# Patient Record
Sex: FEMALE | Race: WHITE | NOT HISPANIC OR LATINO | Employment: PART TIME | URBAN - METROPOLITAN AREA
[De-identification: names, ages, dates, MRNs, and addresses within clinical notes are randomized per-mention and may not be internally consistent; named-entity substitution may affect disease eponyms.]

---

## 2017-04-22 ENCOUNTER — APPOINTMENT (EMERGENCY)
Dept: RADIOLOGY | Facility: HOSPITAL | Age: 62
End: 2017-04-22
Payer: COMMERCIAL

## 2017-04-22 ENCOUNTER — HOSPITAL ENCOUNTER (EMERGENCY)
Facility: HOSPITAL | Age: 62
Discharge: HOME | End: 2017-04-22
Attending: EMERGENCY MEDICINE | Admitting: EMERGENCY MEDICINE
Payer: COMMERCIAL

## 2017-04-22 VITALS
WEIGHT: 130 LBS | DIASTOLIC BLOOD PRESSURE: 71 MMHG | OXYGEN SATURATION: 99 % | RESPIRATION RATE: 18 BRPM | SYSTOLIC BLOOD PRESSURE: 157 MMHG | BODY MASS INDEX: 22.31 KG/M2 | TEMPERATURE: 97.1 F | HEART RATE: 86 BPM

## 2017-04-22 DIAGNOSIS — M25.551 RIGHT HIP PAIN: Primary | ICD-10-CM

## 2017-04-22 PROCEDURE — 73552 X-RAY EXAM OF FEMUR 2/>: CPT

## 2017-04-22 PROCEDURE — 99283 EMERGENCY DEPT VISIT LOW MDM: CPT

## 2017-04-22 RX ORDER — OXYCODONE HYDROCHLORIDE 5 MG/1
5 TABLET ORAL ONCE
Status: COMPLETED | OUTPATIENT
Start: 2017-04-22 | End: 2017-04-22

## 2017-04-22 RX ORDER — DIAZEPAM 5 MG/1
5 TABLET ORAL ONCE
Status: COMPLETED | OUTPATIENT
Start: 2017-04-22 | End: 2017-04-22

## 2017-04-22 RX ADMIN — OXYCODONE HYDROCHLORIDE 5 MG: 5 TABLET ORAL at 11:11

## 2017-04-22 RX ADMIN — DIAZEPAM 5 MG: 5 TABLET ORAL at 11:11

## 2017-08-02 ENCOUNTER — HOSPITAL ENCOUNTER (EMERGENCY)
Facility: HOSPITAL | Age: 62
Discharge: HOME | End: 2017-08-02
Attending: EMERGENCY MEDICINE | Admitting: EMERGENCY MEDICINE

## 2017-08-02 VITALS
SYSTOLIC BLOOD PRESSURE: 217 MMHG | TEMPERATURE: 97.6 F | OXYGEN SATURATION: 96 % | RESPIRATION RATE: 18 BRPM | DIASTOLIC BLOOD PRESSURE: 108 MMHG | HEART RATE: 95 BPM

## 2017-08-02 DIAGNOSIS — L25.9 CONTACT DERMATITIS: Primary | ICD-10-CM

## 2017-08-02 PROCEDURE — 96372 THER/PROPH/DIAG INJ SC/IM: CPT

## 2017-08-02 PROCEDURE — 99282 EMERGENCY DEPT VISIT SF MDM: CPT

## 2017-08-02 RX ORDER — PREDNISONE 20 MG/1
60 TABLET ORAL DAILY
Qty: 26 TABLET | Refills: 0 | Status: SHIPPED | OUTPATIENT
Start: 2017-08-02 | End: 2017-08-06

## 2017-08-02 RX ORDER — METHYLPREDNISOLONE ACETATE 80 MG/ML
80 INJECTION, SUSPENSION INTRA-ARTICULAR; INTRALESIONAL; INTRAMUSCULAR; SOFT TISSUE ONCE
Status: COMPLETED | OUTPATIENT
Start: 2017-08-02 | End: 2017-08-02

## 2017-08-02 RX ORDER — HYDROXYZINE HYDROCHLORIDE 25 MG/1
25 TABLET, FILM COATED ORAL EVERY 6 HOURS PRN
Qty: 30 TABLET | Refills: 0 | Status: SHIPPED | OUTPATIENT
Start: 2017-08-02 | End: 2017-11-05

## 2017-08-02 RX ADMIN — METHYLPREDNISOLONE ACETATE 80 MG: 80 INJECTION, SUSPENSION INTRA-ARTICULAR; INTRALESIONAL; INTRAMUSCULAR; SOFT TISSUE at 19:22

## 2017-11-05 ENCOUNTER — APPOINTMENT (EMERGENCY)
Dept: ULTRASOUND IMAGING | Facility: HOSPITAL | Age: 62
End: 2017-11-05

## 2017-11-05 ENCOUNTER — APPOINTMENT (EMERGENCY)
Dept: RADIOLOGY | Facility: HOSPITAL | Age: 62
End: 2017-11-05

## 2017-11-05 ENCOUNTER — HOSPITAL ENCOUNTER (EMERGENCY)
Facility: HOSPITAL | Age: 62
Discharge: HOME | End: 2017-11-05
Attending: EMERGENCY MEDICINE | Admitting: EMERGENCY MEDICINE

## 2017-11-05 VITALS
DIASTOLIC BLOOD PRESSURE: 101 MMHG | SYSTOLIC BLOOD PRESSURE: 137 MMHG | OXYGEN SATURATION: 98 % | TEMPERATURE: 98.4 F | WEIGHT: 146.39 LBS | RESPIRATION RATE: 18 BRPM | HEART RATE: 99 BPM | BODY MASS INDEX: 25.13 KG/M2

## 2017-11-05 DIAGNOSIS — M79.606 LEG PAIN: Primary | ICD-10-CM

## 2017-11-05 LAB
ANION GAP SERPL CALCULATED.3IONS-SCNC: 9 MMOL/L (ref 4–13)
APTT PPP: 30 SECONDS (ref 23–35)
BASOPHILS # BLD AUTO: 0.07 THOUSANDS/ΜL (ref 0–0.1)
BASOPHILS NFR BLD AUTO: 1 % (ref 0–1)
BUN SERPL-MCNC: 13 MG/DL (ref 5–25)
CALCIUM SERPL-MCNC: 8.5 MG/DL (ref 8.3–10.1)
CHLORIDE SERPL-SCNC: 105 MMOL/L (ref 100–108)
CO2 SERPL-SCNC: 29 MMOL/L (ref 21–32)
CREAT SERPL-MCNC: 0.87 MG/DL (ref 0.6–1.3)
EOSINOPHIL # BLD AUTO: 0.2 THOUSAND/ΜL (ref 0–0.61)
EOSINOPHIL NFR BLD AUTO: 4 % (ref 0–6)
ERYTHROCYTE [DISTWIDTH] IN BLOOD BY AUTOMATED COUNT: 12.7 % (ref 11.6–15.1)
GFR SERPL CREATININE-BSD FRML MDRD: 73 ML/MIN/1.73SQ M
GLUCOSE SERPL-MCNC: 89 MG/DL (ref 65–140)
HCT VFR BLD AUTO: 39.7 % (ref 34.8–46.1)
HGB BLD-MCNC: 12.5 G/DL (ref 11.5–15.4)
INR PPP: 0.87 (ref 0.86–1.16)
LYMPHOCYTES # BLD AUTO: 2.24 THOUSANDS/ΜL (ref 0.6–4.47)
LYMPHOCYTES NFR BLD AUTO: 45 % (ref 14–44)
MCH RBC QN AUTO: 30.6 PG (ref 26.8–34.3)
MCHC RBC AUTO-ENTMCNC: 31.5 G/DL (ref 31.4–37.4)
MCV RBC AUTO: 97 FL (ref 82–98)
MONOCYTES # BLD AUTO: 0.35 THOUSAND/ΜL (ref 0.17–1.22)
MONOCYTES NFR BLD AUTO: 7 % (ref 4–12)
NEUTROPHILS # BLD AUTO: 2.16 THOUSANDS/ΜL (ref 1.85–7.62)
NEUTS SEG NFR BLD AUTO: 43 % (ref 43–75)
PLATELET # BLD AUTO: 240 THOUSANDS/UL (ref 149–390)
PMV BLD AUTO: 9.4 FL (ref 8.9–12.7)
POTASSIUM SERPL-SCNC: 3.8 MMOL/L (ref 3.5–5.3)
PROTHROMBIN TIME: 12.1 SECONDS (ref 12.1–14.4)
RBC # BLD AUTO: 4.09 MILLION/UL (ref 3.81–5.12)
SODIUM SERPL-SCNC: 143 MMOL/L (ref 136–145)
WBC # BLD AUTO: 5.02 THOUSAND/UL (ref 4.31–10.16)

## 2017-11-05 PROCEDURE — 73552 X-RAY EXAM OF FEMUR 2/>: CPT

## 2017-11-05 PROCEDURE — 96374 THER/PROPH/DIAG INJ IV PUSH: CPT

## 2017-11-05 PROCEDURE — 93970 EXTREMITY STUDY: CPT

## 2017-11-05 PROCEDURE — 80048 BASIC METABOLIC PNL TOTAL CA: CPT | Performed by: PHYSICIAN ASSISTANT

## 2017-11-05 PROCEDURE — 85730 THROMBOPLASTIN TIME PARTIAL: CPT | Performed by: PHYSICIAN ASSISTANT

## 2017-11-05 PROCEDURE — 73590 X-RAY EXAM OF LOWER LEG: CPT

## 2017-11-05 PROCEDURE — 36415 COLL VENOUS BLD VENIPUNCTURE: CPT | Performed by: PHYSICIAN ASSISTANT

## 2017-11-05 PROCEDURE — 99284 EMERGENCY DEPT VISIT MOD MDM: CPT

## 2017-11-05 PROCEDURE — 85610 PROTHROMBIN TIME: CPT | Performed by: PHYSICIAN ASSISTANT

## 2017-11-05 PROCEDURE — 85025 COMPLETE CBC W/AUTO DIFF WBC: CPT | Performed by: PHYSICIAN ASSISTANT

## 2017-11-05 RX ORDER — IBUPROFEN 600 MG/1
600 TABLET ORAL EVERY 6 HOURS PRN
Qty: 30 TABLET | Refills: 0 | Status: SHIPPED | OUTPATIENT
Start: 2017-11-05 | End: 2018-09-14 | Stop reason: HOSPADM

## 2017-11-05 RX ORDER — KETOROLAC TROMETHAMINE 30 MG/ML
15 INJECTION, SOLUTION INTRAMUSCULAR; INTRAVENOUS ONCE
Status: COMPLETED | OUTPATIENT
Start: 2017-11-05 | End: 2017-11-05

## 2017-11-05 RX ADMIN — KETOROLAC TROMETHAMINE 15 MG: 30 INJECTION, SOLUTION INTRAMUSCULAR at 13:22

## 2017-11-05 NOTE — ED PROVIDER NOTES
History  Chief Complaint   Patient presents with    Leg Pain     c/o bilateral leg pain x 29 days  Patient presents to emergency department bilateral leg pain been getting worse  Patient walks a lot at work he has been having persistent pain  Patient is particularly having pain at calf bilaterally  Special the right  No recent flights or travel or hormonal medication however will do venous about to make sure there is no DVT  Patient agrees  Patient have him hip fracture and has had surgery to the right several years ago  Patient taking over-the-counter cough medication without relief  None       Past Medical History:   Diagnosis Date    Arthritis     Hypertension     Osteoporosis        Past Surgical History:   Procedure Laterality Date    CARPAL TUNNEL RELEASE Right     FRACTURE SURGERY      Right hip ORIF    LA OPEN FIX INTER/SUBTROCH FX,IMPLNT Right 4/29/2016    Procedure: Removal of deep implant; IM inpending right femur fracture;  Surgeon: Rose Marie Johnston MD;  [facility]: [facility];  Service: Orthopedics    TUBAL LIGATION         Family History   Problem Relation Age of Onset    Hypertension Mother     Thyroid disease Mother     Osteoporosis Mother     Hypertension Father     Aneurysm Father      I have reviewed and agree with the history as documented  Social History   Substance Use Topics    Smoking status: Current Every Day Smoker     Types: Cigarettes    Smokeless tobacco: Not on file      Comment: 2 packs a week    Alcohol use No        Review of Systems   All other systems reviewed and are negative        Physical Exam  ED Triage Vitals [11/05/17 1133]   Temperature Pulse Respirations Blood Pressure SpO2   98 4 °F (36 9 °C) 99 18 (!) 137/101 98 %      Temp Source Heart Rate Source Patient Position - Orthostatic VS BP Location FiO2 (%)   Oral Monitor Sitting Right arm --      Pain Score       Worst Possible Pain           Orthostatic Vital Signs  Vitals: 11/05/17 1133   BP: (!) 137/101   Pulse: 99   Patient Position - Orthostatic VS: Sitting       Physical Exam   Constitutional: She is oriented to person, place, and time  She appears well-developed and well-nourished  HENT:   Head: Normocephalic  Mouth/Throat: Oropharynx is clear and moist    Eyes: Conjunctivae and EOM are normal    Neck: Neck supple  Cardiovascular: Normal rate, regular rhythm and normal heart sounds  Pulmonary/Chest: Effort normal and breath sounds normal    Abdominal: Soft  Bowel sounds are normal    Musculoskeletal:   Patient has diffuse pain to both of her legs especially to Kitty reproduce to palpation  There is no significant edema  Good pulses good range of motion patient has pain to her entire legs bilaterally  No contusions or abrasions  Neurological: She is alert and oriented to person, place, and time  Skin: Skin is warm  Psychiatric: She has a normal mood and affect  Her behavior is normal    Nursing note and vitals reviewed  ED Medications  Medications   ketorolac (TORADOL) 30 mg/mL injection 15 mg (15 mg Intravenous Given 11/5/17 1322)       Diagnostic Studies  Results Reviewed     Procedure Component Value Units Date/Time    Protime-INR [[id number]]  (Normal) Collected:  11/05/17 1246    Lab Status:  Final result Specimen:  Blood from Arm, Right Updated:  11/05/17 1301     Protime 12 1 seconds      INR 0 87    APTT [[id number]]  (Normal) Collected:  11/05/17 1246    Lab Status:  Final result Specimen:  Blood from Arm, Right Updated:  11/05/17 1301     PTT 30 seconds     Narrative:          Therapeutic Heparin Range = 60-90 seconds    Basic metabolic panel [[id number]] Collected:  11/05/17 1246    Lab Status:  Final result Specimen:  Blood from Arm, Right Updated:  11/05/17 1301     Sodium 143 mmol/L      Potassium 3 8 mmol/L      Chloride 105 mmol/L      CO2 29 mmol/L      Anion Gap 9 mmol/L      BUN 13 mg/dL      Creatinine 0 87 mg/dL      Glucose 89 mg/dL Calcium 8 5 mg/dL      eGFR 73 ml/min/1 73sq m     Narrative:         National Kidney Disease Education Program recommendations are as follows:  GFR calculation is accurate only with a steady state creatinine  Chronic Kidney disease less than 60 ml/min/1 73 sq  meters  Kidney failure less than 15 ml/min/1 73 sq  meters  CBC and differential [[id number]]  (Abnormal) Collected:  11/05/17 1246    Lab Status:  Final result Specimen:  Blood from Arm, Right Updated:  11/05/17 1253     WBC 5 02 Thousand/uL      RBC 4 09 Million/uL      Hemoglobin 12 5 g/dL      Hematocrit 39 7 %      MCV 97 fL      MCH 30 6 pg      MCHC 31 5 g/dL      RDW 12 7 %      MPV 9 4 fL      Platelets 962 Thousands/uL      Neutrophils Relative 43 %      Lymphocytes Relative 45 (H) %      Monocytes Relative 7 %      Eosinophils Relative 4 %      Basophils Relative 1 %      Neutrophils Absolute 2 16 Thousands/µL      Lymphocytes Absolute 2 24 Thousands/µL      Monocytes Absolute 0 35 Thousand/µL      Eosinophils Absolute 0 20 Thousand/µL      Basophils Absolute 0 07 Thousands/µL                  XR tibia fibula 2 views LEFT   Final Result by Lili Draper MD (11/05 1254)      Unremarkable examination         Workstation performed: [id number]         XR tibia fibula 2 views RIGHT   Final Result by Lili Draper MD (11/05 1254)      Unremarkable examination         Workstation performed: [id number]         XR femur 2 views LEFT   Final Result by Lili Draper MD (11/05 1253)      Unremarkable left femur exam         Workstation performed: [id number]         XR femur 2 views RIGHT   Final Result by Lili Draper MD (11/05 1252)      Stable appearance to right femur no acute osseous injury           Workstation performed: [id number]         VAS lower limb venous duplex study, complete bilateral    (Results Pending)              Procedures  Procedures       Phone Contacts  ED Phone Contact    ED Course  ED Course as of Nov 05 1349   Sun Nov 05, 2017   1328 Spoke with ultrasound negative for DVT  MDM  Number of Diagnoses or Management Options  Leg pain: new and requires workup     Amount and/or Complexity of Data Reviewed  Clinical lab tests: reviewed  Tests in the radiology section of CPT®: reviewed  Discuss the patient with other providers: yes  Independent visualization of images, tracings, or specimens: yes    Patient Progress  Patient progress: improved    CritCare Time    Disposition  Final diagnoses:   Leg pain     Time reflects when diagnosis was documented in both MDM as applicable and the Disposition within this note     Time User Action Codes Description Comment    11/5/2017  1:45 PM Summer Washington [M79 606] Leg pain       ED Disposition     ED Disposition Condition Comment    Discharge  Delta Waldemar discharge to home/self care  Condition at discharge: Good        Follow-up Information     Follow up With Specialties Details Why Contact Info Additional [address], DO Internal Medicine   [address]  [phone]       [address] [facility] [address]  [phone] [facility], [address], [place], [place], [address]        Patient's Medications   Discharge Prescriptions    IBUPROFEN (MOTRIN) 600 MG TABLET    Take 1 tablet by mouth every 6 (six) hours as needed for mild pain for up to 10 days       Start Date: 11/5/2017 End Date: 11/15/2017       Order Dose: 600 mg       Quantity: 30 tablet    Refills: 0     No discharge procedures on file      ED Provider  Electronically Signed by           Julia Thornton PA-C  11/05/17 9017

## 2017-11-05 NOTE — DISCHARGE INSTRUCTIONS
Leg Cramps   WHAT YOU NEED TO KNOW:   A leg cramp is a sudden, painful squeeze in your calf (lower leg) or thigh (upper leg) muscles  The muscle may twitch under the skin or feel hard  Your leg may feel sore long after the muscles relax  DISCHARGE INSTRUCTIONS:   To stretch your leg:  Warm up your muscles before you stretch  Take a short walk or run slowly in place  If you get leg cramps while you sleep, you may also want to stretch before bedtime  The following exercises stretch the calves and thighs to help stop or prevent leg cramps:  · To stretch your calf and heel:      ¨ Stand up and place the palms of your hands against a wall  ¨ Lean into the wall, with one leg behind the other  Bend the front leg and keep the back leg straight  ¨ Press the heel of your back leg into the floor  ¨ Hold for 15 to 30 seconds, then switch sides  · To stretch the back of your knee and thigh:      ¨ Sit on the floor with your legs straight in front of you  Do not point your toes  ¨ Place the palms of your hands at your sides on the floor  Slide your hands past your hips toward your ankles  ¨ Move toward your ankles until you feel a stretch in the back of your legs  Do not try to touch your head to your knees or round your back  ¨ Hold for 30 seconds  Drink plenty of liquids during exercise:  Water and other liquids help prevent cramps by replacing fluids lost in sweat  Drink more liquids when you are active in hot weather  To stop a leg cramp if it happens again:   · Stretch and massage your muscle to stop the cramp  · Apply heat or ice packs to the cramp  A heating pad or hot pack may help relieve tight muscles  An ice pack or crushed ice in a bag, wrapped in a towel can soothe tender or sore muscles  Take your medicine as directed:  Call your healthcare provider if you think your medicine is not helping or if you have side effects  Tell him if you are taking any vitamins, herbs, or other medicines  Keep a list of the medicines you take  Include the amounts, and when and why you take them  Bring the list or the pill bottles to follow-up visits  Follow up with your healthcare provider as directed:  Write down any questions you have so you remember to ask them in your follow-up visits  Contact your healthcare provider if:   · Your leg cramps get worse or happen more often  · Your leg feels numb  · Your leg cramps do not go away with stretching or massage  · You feel dizzy, lightheaded, or confused when you exercise in hot weather  You may have a headache or blurred vision  © 2017 [employer]  Information is for End User's use only and may not be sold, redistributed or otherwise used for commercial purposes  All illustrations and images included in CareNotes® are the copyrighted property of A D A M , Inc  or Thomas Gonzales  The above information is an  only  It is not intended as medical advice for individual conditions or treatments  Talk to your doctor, nurse or pharmacist before following any medical regimen to see if it is safe and effective for you  Leg Pain   WHAT YOU NEED TO KNOW:   Leg pain may be caused by a variety of health conditions  Your tests did not show any broken bones or blood clots  DISCHARGE INSTRUCTIONS:   Return to the emergency department if:   · You have a fever  · Your leg starts to swell  · Your leg pain gets worse  · You have numbness or tingling in your leg or toes  · You cannot put any weight on or move your leg  Contact your healthcare provider if:   · Your pain does not decrease, even after treatment  · You have questions or concerns about your condition or care  Medicines:   · NSAIDs , such as ibuprofen, help decrease swelling, pain, and fever  This medicine is available with or without a doctor's order  NSAIDs can cause stomach bleeding or kidney problems in certain people   If you take blood thinner medicine, always ask your healthcare provider if NSAIDs are safe for you  Always read the medicine label and follow directions  · Take your medicine as directed  Contact your healthcare provider if you think your medicine is not helping or if you have side effects  Tell him of her if you are allergic to any medicine  Keep a list of the medicines, vitamins, and herbs you take  Include the amounts, and when and why you take them  Bring the list or the pill bottles to follow-up visits  Carry your medicine list with you in case of an emergency  Follow up with your healthcare provider as directed: You may need more tests to find the cause of your leg pain  You may need to see an orthopedic specialist or a physical therapist  Write down your questions so you remember to ask them during your visits  Manage your leg pain:   · Rest  your injured leg so that it can heal  You may need an immobilizer, brace, or splint to limit the movement of your leg  You may need to avoid putting any weight on your leg for at least 48 hours  Return to normal activities as directed  · Ice  the injury for 20 minutes every 4 hours for up to 24 hours, or as directed  Use an ice pack, or put crushed ice in a plastic bag  Cover it with a towel to protect your skin  Ice helps prevent tissue damage and decreases swelling and pain  · Elevate  your injured leg above the level of your heart as often as you can  This will help decrease swelling and pain  If possible, prop your leg on pillows or blankets to keep the area elevated comfortably  · Use assistive devices as directed  You may need to use a cane or crutches  Assistive devices help decrease pain and pressure on your leg when you walk  Ask your healthcare provider for more information about assistive devices and how to use them correctly  · Maintain a healthy weight  Extra body weight can cause pressure and pain in your hip, knee, and ankle joints   Ask your healthcare provider how much you should weigh  Ask him to help you create a weight loss plan if you are overweight  © 2017 [employer] Information is for End User's use only and may not be sold, redistributed or otherwise used for commercial purposes  All illustrations and images included in CareNotes® are the copyrighted property of A D A M , Inc  or Thomas Gonzales  The above information is an  only  It is not intended as medical advice for individual conditions or treatments  Talk to your doctor, nurse or pharmacist before following any medical regimen to see if it is safe and effective for you

## 2018-01-11 NOTE — MISCELLANEOUS
Provider Comments  Provider Comments:   pt did not show  will need to reschedule        Signatures   Electronically signed by : Brittney Forrest DO; May 23 2016 11:28AM EST                       (Author)    Electronically signed by : Jeremie Garza DO; May 23 2016 11:55AM EST                       (Review)

## 2018-01-12 NOTE — PROGRESS NOTES
Preliminary Nursing Report                Patient Information    Initial Encounter Entry Date:   3/25/2016 12:53 PM EST (Automated Transmission Automated Transmission)       Last Modified:   {Adia Orozco}              Legal Name: Clarisa Martinez        Social Security Number:        YOB: 1955        Age (years): 64        Gender: F        Body Mass Index (BMI): 22 kg/m2        Height: 63 in  Weight: 123 lbs (56 kgs)           Address:   [address] [place] [address]              Phone: [phone]   (consent to leave messages)        Email:        Ethnicity: Decline to State        Christianity:        Marital Status:        Preferred Language: English        Race: Other Race                    Patient Insurance Information        Primary Insurance Information Carrier Name: {Primary  CarrierName}           Carrier Address:   {Primary  CarrierAddress}              Carrier Phone: {Primary  CarrierPhone}          Group Number: {Primary  GroupNumber}          Policy Number: {Primary  PolicyNumber}          Insured Name: {Primary  InsuredName}          Insured : {Primary  InsuredDOB}          Relationship to Insured: {Primary  RelationshiptoInsured}           Secondary Insurance Information Carrier Name: {Secondary  CarrierName}           Carrier Address:   {Secondary  CarrierAddress}              Carrier Phone: {Secondary  CarrierPhone}          Group Number: {Secondary  GroupNumber}          Policy Number: {Secondary  PolicyNumber}          Insured Name: {Secondary  InsuredName}          Insured : {Secondary  InsuredDOB}          Relationship to Insured: {Secondary  RelationshiptoInsured}                       Health Profile   Booking #:   Nuria Evans #: [id number]               DOS: 2016    Surgery : REPAIR OF THIGH FRACTURE        Add'l Procedures/Notes:     Surgery Risk: Intermediate          Precautions          Allergies    Naproxen TABS       Clinical Comments: Reaction Type: , Reaction: , Severity:              Medications    Acetaminophen-Codeine #3 300-30 MG Oral Tablet       Alendronate Sodium 70 MG Oral Tablet       AmLODIPine Besylate 5 MG Oral Tablet       Calcium 600+D High Potency 600-400 MG-UNIT Oral Tablet       TraMADol HCl - 50 MG Oral Tablet               Conditions    Benign hypertension       Breast cancer screening       Complicated open wound of thigh, right, sequela       Contusion of anterior thigh       Contusion Of The Hip With Intact Skin Surface       Current tobacco use       Encounter for smoking cessation counseling       Hearing Loss       Hip pain, right       Hip pain, unspecified laterality       Limb pain       Need for immunization against influenza       Need for influenza vaccination       Osteoporosis       Other complications due to other internal orthopedic device, implant, and graft       Pain in wrist, unspecified laterality       Pre-op evaluation       Refused influenza vaccine       Right knee pain       Screen for colon cancer       Tobacco abuse counseling               Family History    None             Surgical History    None             Social History    Being A Social Drinker       Current Diet High In Sugar Includes High Sugar Beverages       Current Every Day Smoker       Daily Coffee Consumption (1 Cups/Day)       Denies Alcohol Use (History)       Denies Drug Use                               Patient Instructions       ? NPO Instructions   The day before surgery it is recommended to have a light dinner at your usual time and you are allowed a light snack early in the evening  Do not eat anything heavy or eat a big meal after 7pm  Do not eat or drink anything after midnight prior to your surgery  If you are supposed to take any of your medications, do so with a sip of water  Failure to follow these instructions can lead to an increased risk of lung complications and may result in a delay or cancellation of your procedure   If you have any questions, contact your institution for further instructions  No candy, no gum, no mints, no chewing tobacco   Triggered by: Medical Procedure Risk         ? Calcium Blocker (Blood Pressure Medication) 3, 4, 6, 5, 2  Please continue to take this medication on your normal schedule  If this is an oral medication and you take in the morning, you may do so with a sip of water  Triggered by: AmLODIPine Besylate 5 MG Oral Tablet         ? Opioids (Pain Medication) 62  Please continue the following medications, if needed, up to and including the day of surgery (with a sip of water)  Triggered by: TraMADol HCl - 50 MG Oral Tablet         ? Smoking Cessation   Smoking before and after surgery can lead to complications  Patients who quit smoking at least eight weeks before surgery have complication rates almost as low as non-smokers  Smokers who can stop smoking 24 or 48 hours before surgery may also benefit from decreased amounts of nicotine and carbon monoxide in the body  For help quitting smoking, speak with your physician or contact the [address] [place] [address] or American [employer]  Please visit the following web address for assistance with quitting  [facility] be  [email or web address]  aspx  Triggered by: Current tobacco use               Testing Considerations       ? Basic Metabolic Panel (BMP) t  If test was completed and normal within last six months, repeat test is not necessary  Triggered by: Benign hypertension         ? Complete Blood Count (CBC) t, client, client  If test was completed and normal within last six months, repeat test is not necessary  Triggered by: Age or Facility Rec         ? Electrocardiogram (ECG) t  Patient does not need new test if normal ECG is present within the last six months and no change in clinical condition  Triggered by: Benign hypertension         ? Type and Screen client  Type and Screen - Blood:  If there is anticipated or possible large blood loss with this procedure, then a Type and Screen for Blood should be ordered  Triggered by: Age or Facility Rec               Consultations       ? Pain Management Notes client  The patient has listed conditions or takes medication that may affect their pain management  Triggered by: TraMADol HCl - 50 MG Oral Tablet, Age or Facility Rec               Miscellaneous Questions         Question: Are you able to walk up a flight of stairs, walk up a hill or do heavy housework WITHOUT having chest pain or shortness of breath? Answer: YES                   Allergies/Conditions/Medications Not Found        The following were not recognized by our system when generating the recommendations  Please consider if this would impact any preoperative protocols  ? Being A Social Drinker       ? Contusion Of The Hip With Intact Skin Surface       ? Current Diet High In Sugar Includes High Sugar Beverages       ? Daily Coffee Consumption (1 Cups/Day)       ? Denies Alcohol Use (History)       ? Denies Drug Use       ? Hearing Loss       ? Hip pain, unspecified laterality       ? Acetaminophen-Codeine #3 300-30 MG Oral Tablet                  Appointment Information         Date:    04/29/2016        Location:    [place]        Address:           Directions:                      Footnotes revision 14      ?? Denotes a free-text entry  Legal Disclaimer: Any and all recommendations and services provided herein are designed to assist in the preoperative care of the patient  Nothing contained herein is designed to replace, eliminate or alleviate the responsibility of the attending physician to supervise and determine the patient?s preoperative care and course of treatment  Failure to provide complete, accurate information may negatively impact the system?s ability to recommend the proper preoperative protocol       THE ATTENDING PHYSICIAN IS RESPONSIBLE TO REVIEW THE SUGGESTED PREOPERATIVE PROTOCOLS/COURSE OF TREATMENT AND PRESCRIBE THE FINAL COURSE OF PREOPERATIVE TREATMENT IN CONSULTATION WITH THE PATIENT  THE ePREOP SYSTEM AND ITS MATERIALS ARE PROVIDED ? AS IS? WITHOUT WARRANTY OF ANY KIND, EXPRESS OR IMPLIED, INCLUDING, BUT NOT LIMITED TO, WARRANTIES OF PERFORMANCE OR MERCHANTABILITY OR FITNESS FOR A PARTICULAR PURPOSE  PATIENT AND PHYSICIANS HEREBY AGREE THAT THEIR USE OF THE MATERIALS AND RESOURCES ACT AS A CONSENT TO RELEASE AND WAIVE ePREOP FROM ANY AND ALL CLAIMS OF WARRANTY, TORT OR CONTRACT LAW OF ANY KIND  Electronically signed by:Louisa SAMSON    Mar 29 2016  4:24PM EST

## 2018-09-04 ENCOUNTER — ANESTHESIA EVENT (OUTPATIENT)
Dept: PERIOP | Facility: HOSPITAL | Age: 63
End: 2018-09-04
Payer: COMMERCIAL

## 2018-09-04 RX ORDER — SODIUM CHLORIDE 9 MG/ML
125 INJECTION, SOLUTION INTRAVENOUS CONTINUOUS
Status: CANCELLED | OUTPATIENT
Start: 2018-09-14

## 2018-09-05 ENCOUNTER — APPOINTMENT (OUTPATIENT)
Dept: LAB | Facility: HOSPITAL | Age: 63
End: 2018-09-05
Attending: PODIATRIST
Payer: COMMERCIAL

## 2018-09-05 ENCOUNTER — HOSPITAL ENCOUNTER (OUTPATIENT)
Dept: NON INVASIVE DIAGNOSTICS | Facility: HOSPITAL | Age: 63
Discharge: HOME | End: 2018-09-05
Attending: PODIATRIST
Payer: COMMERCIAL

## 2018-09-05 ENCOUNTER — HOSPITAL ENCOUNTER (OUTPATIENT)
Dept: RADIOLOGY | Facility: HOSPITAL | Age: 63
Discharge: HOME | End: 2018-09-05
Attending: PODIATRIST
Payer: COMMERCIAL

## 2018-09-05 ENCOUNTER — TRANSCRIBE ORDERS (OUTPATIENT)
Dept: ADMINISTRATIVE | Facility: HOSPITAL | Age: 63
End: 2018-09-05

## 2018-09-05 ENCOUNTER — APPOINTMENT (OUTPATIENT)
Dept: PREADMISSION TESTING | Facility: HOSPITAL | Age: 63
End: 2018-09-05
Payer: COMMERCIAL

## 2018-09-05 DIAGNOSIS — Z01.818 PREOP EXAMINATION: ICD-10-CM

## 2018-09-05 DIAGNOSIS — M20.22 HALLUX RIGIDUS OF LEFT FOOT: ICD-10-CM

## 2018-09-05 DIAGNOSIS — Z01.818 PREOP EXAMINATION: Primary | ICD-10-CM

## 2018-09-05 LAB
ANION GAP SERPL CALCULATED.3IONS-SCNC: 7 MMOL/L (ref 4–13)
BASOPHILS # BLD AUTO: 0.08 THOUSANDS/ΜL (ref 0–0.1)
BASOPHILS NFR BLD AUTO: 1 % (ref 0–1)
BUN SERPL-MCNC: 18 MG/DL (ref 5–25)
CALCIUM SERPL-MCNC: 9.3 MG/DL (ref 8.3–10.1)
CHLORIDE SERPL-SCNC: 108 MMOL/L (ref 100–108)
CO2 SERPL-SCNC: 27 MMOL/L (ref 21–32)
CREAT SERPL-MCNC: 0.84 MG/DL (ref 0.6–1.3)
EOSINOPHIL # BLD AUTO: 0.1 THOUSAND/ΜL (ref 0–0.61)
EOSINOPHIL NFR BLD AUTO: 2 % (ref 0–6)
ERYTHROCYTE [DISTWIDTH] IN BLOOD BY AUTOMATED COUNT: 13.2 % (ref 11.6–15.1)
GFR SERPL CREATININE-BSD FRML MDRD: 75 ML/MIN/1.73SQ M
GLUCOSE SERPL-MCNC: 83 MG/DL (ref 65–140)
HCT VFR BLD AUTO: 39.2 % (ref 34.8–46.1)
HGB BLD-MCNC: 12.4 G/DL (ref 11.5–15.4)
IMM GRANULOCYTES # BLD AUTO: 0.01 THOUSAND/UL (ref 0–0.2)
IMM GRANULOCYTES NFR BLD AUTO: 0 % (ref 0–2)
LYMPHOCYTES # BLD AUTO: 2.44 THOUSANDS/ΜL (ref 0.6–4.47)
LYMPHOCYTES NFR BLD AUTO: 42 % (ref 14–44)
MCH RBC QN AUTO: 30.2 PG (ref 26.8–34.3)
MCHC RBC AUTO-ENTMCNC: 31.6 G/DL (ref 31.4–37.4)
MCV RBC AUTO: 95 FL (ref 82–98)
MONOCYTES # BLD AUTO: 0.28 THOUSAND/ΜL (ref 0.17–1.22)
MONOCYTES NFR BLD AUTO: 5 % (ref 4–12)
NEUTROPHILS # BLD AUTO: 2.93 THOUSANDS/ΜL (ref 1.85–7.62)
NEUTS SEG NFR BLD AUTO: 50 % (ref 43–75)
NRBC BLD AUTO-RTO: 0 /100 WBCS
PLATELET # BLD AUTO: 307 THOUSANDS/UL (ref 149–390)
PMV BLD AUTO: 9.7 FL (ref 8.9–12.7)
POTASSIUM SERPL-SCNC: 3.8 MMOL/L (ref 3.5–5.3)
RBC # BLD AUTO: 4.11 MILLION/UL (ref 3.81–5.12)
SODIUM SERPL-SCNC: 142 MMOL/L (ref 136–145)
WBC # BLD AUTO: 5.84 THOUSAND/UL (ref 4.31–10.16)

## 2018-09-05 PROCEDURE — 80048 BASIC METABOLIC PNL TOTAL CA: CPT

## 2018-09-05 PROCEDURE — 93005 ELECTROCARDIOGRAM TRACING: CPT

## 2018-09-05 PROCEDURE — 36415 COLL VENOUS BLD VENIPUNCTURE: CPT

## 2018-09-05 PROCEDURE — 85025 COMPLETE CBC W/AUTO DIFF WBC: CPT

## 2018-09-05 NOTE — ANESTHESIA PREPROCEDURE EVALUATION
Review of Systems/Medical History  Patient summary reviewed  Chart reviewed  No history of anesthetic complications     Cardiovascular  Hypertension (fluctuates) controlled,    Pulmonary  Smoker cigarette smoker  ,        GI/Hepatic  Negative GI/hepatic ROS          Negative  ROS        Endo/Other  Negative endo/other ROS      GYN  Negative gynecology ROS          Hematology  Anemia ,     Musculoskeletal    Arthritis     Neurology  Negative neurology ROS      Psychology   Anxiety,              Physical Exam    Airway    Mallampati score: II  TM Distance: >3 FB  Neck ROM: full     Dental   upper dentures and lower dentures,     Cardiovascular  Rhythm: regular, Rate: normal,     Pulmonary  Breath sounds clear to auscultation,     Other Findings        Anesthesia Plan  ASA Score- 3     Anesthesia Type- IV sedation with anesthesia with ASA Monitors  Additional Monitors:   Airway Plan: LMA  Plan Factors- Patient instructed to abstain from smoking on day of procedure       Induction- intravenous  Postoperative Plan- Plan for postoperative opioid use  Informed Consent- Anesthetic plan and risks discussed with patient

## 2018-09-05 NOTE — PRE-PROCEDURE INSTRUCTIONS
Pre-Surgery Instructions:   Medication Instructions    ibuprofen (MOTRIN) 600 mg tablet Patient was instructed by Physician and understands  Seen by Dr Nathan Kenyon- no medications required before surgery    Instructed re: Chlorhexidine showers per hospital policy

## 2018-09-06 LAB
ATRIAL RATE: 100 BPM
P AXIS: 54 DEGREES
PR INTERVAL: 170 MS
QRS AXIS: -14 DEGREES
QRSD INTERVAL: 110 MS
QT INTERVAL: 392 MS
QTC INTERVAL: 505 MS
T WAVE AXIS: 131 DEGREES
VENTRICULAR RATE: 100 BPM

## 2018-09-06 PROCEDURE — 93010 ELECTROCARDIOGRAM REPORT: CPT | Performed by: INTERNAL MEDICINE

## 2018-09-12 ENCOUNTER — TELEPHONE (OUTPATIENT)
Dept: INTERNAL MEDICINE CLINIC | Facility: CLINIC | Age: 63
End: 2018-09-12

## 2018-09-12 ENCOUNTER — HOSPITAL ENCOUNTER (OUTPATIENT)
Dept: RADIOLOGY | Facility: HOSPITAL | Age: 63
Discharge: HOME | End: 2018-09-12
Attending: PODIATRIST
Payer: COMMERCIAL

## 2018-09-12 ENCOUNTER — OFFICE VISIT (OUTPATIENT)
Dept: INTERNAL MEDICINE CLINIC | Facility: CLINIC | Age: 63
End: 2018-09-12
Payer: COMMERCIAL

## 2018-09-12 VITALS
BODY MASS INDEX: 23.34 KG/M2 | HEART RATE: 88 BPM | TEMPERATURE: 99 F | HEIGHT: 64 IN | SYSTOLIC BLOOD PRESSURE: 120 MMHG | DIASTOLIC BLOOD PRESSURE: 78 MMHG | WEIGHT: 136.69 LBS

## 2018-09-12 DIAGNOSIS — Z12.11 SCREENING FOR COLON CANCER: ICD-10-CM

## 2018-09-12 DIAGNOSIS — Z01.818 PRE-OPERATIVE CLEARANCE: ICD-10-CM

## 2018-09-12 DIAGNOSIS — I10 HYPERTENSION: Primary | ICD-10-CM

## 2018-09-12 DIAGNOSIS — Z01.818 PREOP EXAMINATION: ICD-10-CM

## 2018-09-12 PROCEDURE — 3074F SYST BP LT 130 MM HG: CPT | Performed by: INTERNAL MEDICINE

## 2018-09-12 PROCEDURE — 71046 X-RAY EXAM CHEST 2 VIEWS: CPT

## 2018-09-12 PROCEDURE — 3078F DIAST BP <80 MM HG: CPT | Performed by: INTERNAL MEDICINE

## 2018-09-12 PROCEDURE — 3008F BODY MASS INDEX DOCD: CPT | Performed by: INTERNAL MEDICINE

## 2018-09-12 PROCEDURE — 99213 OFFICE O/P EST LOW 20 MIN: CPT | Performed by: INTERNAL MEDICINE

## 2018-09-12 RX ORDER — ALENDRONATE SODIUM 70 MG/1
1 TABLET ORAL WEEKLY
Status: ON HOLD | COMMUNITY
Start: 2016-01-08 | End: 2018-09-14 | Stop reason: ALTCHOICE

## 2018-09-12 RX ORDER — ACETAMINOPHEN AND CODEINE PHOSPHATE 300; 30 MG/1; MG/1
1 TABLET ORAL
Status: ON HOLD | COMMUNITY
Start: 2017-05-11 | End: 2018-09-14 | Stop reason: ALTCHOICE

## 2018-09-12 RX ORDER — AMLODIPINE BESYLATE 5 MG/1
1 TABLET ORAL DAILY
Status: ON HOLD | COMMUNITY
Start: 2016-01-25 | End: 2018-09-14 | Stop reason: ALTCHOICE

## 2018-09-12 NOTE — TELEPHONE ENCOUNTER
Please review [facility]/ PRE OP CLEARANCE) form placed in the (GREEN) team folder in the provider flow station  (GIVE TO PATIENT) when form is complete  If the form requires a signature by a MD or DO, please review it with them and sign the form  Please place the form in the (GREEN) team folder in the Clinical flow station at the [address] [facility] and reply to this task to the Clinical Pool

## 2018-09-12 NOTE — PROGRESS NOTES
INTERNAL MEDICINE FOLLOW-UP OFFICE VISIT  [facility]  [address] [place] [address]    NAME: Karen Pollack  AGE: 66 y o  SEX: female    DATE OF ENCOUNTER: 9/12/2018    Assessment and Plan     Diagnoses and all orders for this visit:    Screening for colon cancer  -     Occult Blood, Fecal Immunochemical; Future        Orders Placed This Encounter   Procedures    Occult Blood, Fecal Immunochemical       - Counseling Documentation: patient was counseled regarding: diagnostic results, instructions for management, risk factor reductions, prognosis, patient and family education, impressions, risks and benefits of treatment options and importance of compliance with treatment  - Counseling Time: counseling time more than 50% of visit: 30 minutes  - Barriers to treatment: No  - Medication Side Effects: Adverse side effects of medications were reviewed with the patient/guardian today  Chief Complaint     Chief Complaint   Patient presents with    Pre-op Exam     pt "foot Sx of the left foot"       History of Present Illness     66year old female with PMH of HTN presents for pre-operative clearance for surgery scheduled by Podiatry on 9/14/18  Surgery will be a left hallux fusion  Patient reports no SOB while walking up 4 flights of stairs or 2 blocks  Patient has no acute complaints at this time   Pre-Op Evaluation Assessment  66 y o  female with planned surgery Friday September 14th 2018 of the left big toe  Known risk factors for perioperative complications: None  Difficulty with intubation is not anticipated      Cardiac Risk Estimation: per the Revised Cardiac Risk Index (Circ  [id number], 1999), the patient's risk factors for cardiac complications include NONE OF THE FOLLOOWING "high-risk" surgery (intraperitoneal, intrathoracic, or suprainguinal vascular), history of cerebrovascular disease, history of congestive heart failure, history of ischemic heart disease, on insulin and serum Cr > 2 0 mg/dL, putting her in: RCI RISK CLASS I (0 risk factors, risk of major cardiac compl  appr  0 5%)  Current medications which may produce withdrawal symptoms if withheld perioperatively: None  Pre-Op Evaluation Plan  1  Preoperative workup as follows none  2  Change in medication regimen before surgery: none, continue medication regimen including morning of surgery, with sip of water  3  Prophylaxis for cardiac events with perioperative beta-blockers: not indicated  4  Invasive hemodynamic monitoring perioperatively: not indicated  5  Deep vein thrombosis prophylaxis postoperatively:intermittent pneumatic compression boots  6  Surveillance for postoperative MI with ECG immediately postoperatively and on postoperative days 1 and 2 AND troponin levels 24 hours postoperatively and on day 4 or hospital discharge (whichever comes first): not indicated  7  Other measures: None    The following portions of the patient's history were reviewed and updated as appropriate: allergies, current medications, past family history, past medical history, past social history, past surgical history and problem list     Patient is at low risk for cardiac complications undergoing a low risk procedure and thus can proceed to surgery w/o any further workup  Review of Systems     Review of Systems   Constitutional: Negative for activity change, appetite change, chills, diaphoresis, fever and unexpected weight change  HENT: Negative for congestion, facial swelling and rhinorrhea  Eyes: Negative for photophobia and visual disturbance  Respiratory: Negative for cough, shortness of breath and wheezing  Cardiovascular: Negative for chest pain and palpitations  Gastrointestinal: Negative for abdominal pain, blood in stool, constipation, diarrhea, nausea and vomiting     Genitourinary: Negative for decreased urine volume, difficulty urinating, dysuria, flank pain, frequency, hematuria and urgency  Musculoskeletal: Negative for arthralgias, back pain, joint swelling and myalgias  Neurological: Negative for dizziness, syncope, facial asymmetry, light-headedness, numbness and headaches  Psychiatric/Behavioral: Negative for confusion and decreased concentration  The patient is not nervous/anxious  Active Problem List     Patient Active Problem List   Diagnosis    Other complications due to other internal orthopedic device, implant, and graft    Fever    Osteoarthritis    Hypertension    Acute blood loss anemia    Hematoma of thigh    Complication of internal fixation device (HCC)    Status post hip surgery       Objective     /78 (BP Location: Left arm, Patient Position: Sitting, Cuff Size: Adult)   Pulse 88   Temp 99 °F (37 2 °C) (Oral)   Ht 5' 3 5" (1 613 m)   Wt 62 kg (136 lb 11 oz)   BMI 23 83 kg/m²     Physical Exam   Constitutional: She is oriented to person, place, and time  She appears well-developed and well-nourished  No distress  HENT:   Head: Normocephalic and atraumatic  Nose: Nose normal    Mouth/Throat: Oropharynx is clear and moist  No oropharyngeal exudate  Eyes: EOM are normal  Pupils are equal, round, and reactive to light  Right eye exhibits no discharge  Left eye exhibits no discharge  No scleral icterus  Neck: Normal range of motion  Neck supple  No JVD present  Cardiovascular: Normal rate, regular rhythm, normal heart sounds and intact distal pulses  Exam reveals no gallop and no friction rub  No murmur heard  Pulmonary/Chest: Effort normal and breath sounds normal  No respiratory distress  She has no wheezes  She has no rales  She exhibits no tenderness  Abdominal: Soft  Bowel sounds are normal  She exhibits no distension and no mass  There is no tenderness  There is no rebound and no guarding  Musculoskeletal: Normal range of motion  She exhibits no edema, tenderness or deformity     Lymphadenopathy:     She has no cervical adenopathy  Neurological: She is alert and oriented to person, place, and time  She has normal reflexes  Skin: Skin is warm and dry  No rash noted  She is not diaphoretic  No erythema  No pallor  Psychiatric: She has a normal mood and affect   Her behavior is normal        Pertinent Laboratory/Diagnostic Studies:  CBC:   Lab Results   Component Value Date/Time    WBC 5 84 09/05/2018 01:51 PM    WBC 4 81 10/23/2015 01:09 PM    RBC 4 11 09/05/2018 01:51 PM    RBC 4 49 10/23/2015 01:09 PM    HGB 12 4 09/05/2018 01:51 PM    HGB 13 5 10/23/2015 01:09 PM    HCT 39 2 09/05/2018 01:51 PM    HCT 41 8 10/23/2015 01:09 PM    MCV 95 09/05/2018 01:51 PM    MCV 93 10/23/2015 01:09 PM    MCH 30 2 09/05/2018 01:51 PM    MCH 30 1 10/23/2015 01:09 PM    MCHC 31 6 09/05/2018 01:51 PM    MCHC 32 3 10/23/2015 01:09 PM    RDW 13 2 09/05/2018 01:51 PM    RDW 13 5 10/23/2015 01:09 PM    MPV 9 7 09/05/2018 01:51 PM    MPV 9 6 10/23/2015 01:09 PM     09/05/2018 01:51 PM     10/23/2015 01:09 PM    NRBC 0 09/05/2018 01:51 PM    NEUTOPHILPCT 50 09/05/2018 01:51 PM    NEUTOPHILPCT 52 10/23/2015 01:09 PM    LYMPHOPCT 42 09/05/2018 01:51 PM    LYMPHOPCT 39 10/23/2015 01:09 PM    MONOPCT 5 09/05/2018 01:51 PM    MONOPCT 6 10/23/2015 01:09 PM    EOSPCT 2 09/05/2018 01:51 PM    EOSPCT 2 10/23/2015 01:09 PM    BASOPCT 1 09/05/2018 01:51 PM    BASOPCT 1 10/23/2015 01:09 PM    NEUTROABS 2 93 09/05/2018 01:51 PM    NEUTROABS 2 50 10/23/2015 01:09 PM    LYMPHSABS 2 44 09/05/2018 01:51 PM    LYMPHSABS 1 88 10/23/2015 01:09 PM    MONOSABS 0 28 09/05/2018 01:51 PM    MONOSABS 0 29 10/23/2015 01:09 PM    EOSABS 0 10 09/05/2018 01:51 PM    EOSABS 0 10 10/23/2015 01:09 PM     CBC:   Results from last 6 Months  Lab Units 09/05/18  1351   WBC Thousand/uL 5 84   RBC Million/uL 4 11   HEMOGLOBIN g/dL 12 4   HEMATOCRIT % 39 2   MCV fL 95   MCH pg 30 2   MCHC g/dL 31 6   RDW % 13 2   MPV fL 9 7   PLATELETS Thousands/uL 307   NRBC AUTO /100 WBCs 0 NEUTROS PCT % 50   LYMPHS PCT % 42   MONOS PCT % 5   EOS PCT % 2   BASOS PCT % 1   NEUTROS ABS Thousands/µL 2 93   LYMPHS ABS Thousands/µL 2 44   MONOS ABS Thousand/µL 0 28   EOS ABS Thousand/µL 0 10       Current Medications     Current Outpatient Prescriptions:     acetaminophen-codeine (TYLENOL #3) 300-30 mg per tablet, Take 1 tablet by mouth, Disp: , Rfl:     alendronate (FOSAMAX) 70 mg tablet, Take 1 tablet by mouth once a week, Disp: , Rfl:     amLODIPine (NORVASC) 5 mg tablet, Take 1 tablet by mouth daily, Disp: , Rfl:     Calcium Carbonate-Vitamin D (CALCIUM 600+D HIGH POTENCY) 600-400 MG-UNIT per tablet, Take by mouth, Disp: , Rfl:     ibuprofen (MOTRIN) 600 mg tablet, Take 1 tablet by mouth every 6 (six) hours as needed for mild pain for up to 10 days, Disp: 30 tablet, Rfl: 0    Health Maintenance     Health Maintenance   Topic Date Due    Depression Screening PHQ  1955    CRC Screening: Colonoscopy  1955    INFLUENZA VACCINE  10/12/2018 (Originally 9/1/2018)    DTaP,Tdap,and Td Vaccines (1 - Tdap) 10/12/2018 (Originally 1/2/1995)    Pneumococcal PPSV23 Medium Risk Adult (1 of 1 - PPSV23) 10/12/2018 (Originally 8/19/1976)     Immunization History   Administered Date(s) Administered    Td (adult), adsorbed 01/01/1995    Tuberculin Skin Test-PPD Intradermal 06/28/2004       Stiven SAMSON  Internal Medicine PGY-2  9/12/2018 1:58 PM

## 2018-09-12 NOTE — PATIENT INSTRUCTIONS

## 2018-09-13 PROBLEM — Z01.818 PRE-OPERATIVE CLEARANCE: Status: ACTIVE | Noted: 2018-09-13

## 2018-09-14 ENCOUNTER — HOSPITAL ENCOUNTER (OUTPATIENT)
Facility: HOSPITAL | Age: 63
Setting detail: OUTPATIENT SURGERY
Discharge: HOME | End: 2018-09-14
Attending: PODIATRIST | Admitting: PODIATRIST
Payer: COMMERCIAL

## 2018-09-14 ENCOUNTER — TELEPHONE (OUTPATIENT)
Dept: INTERNAL MEDICINE CLINIC | Facility: CLINIC | Age: 63
End: 2018-09-14

## 2018-09-14 ENCOUNTER — ANESTHESIA (OUTPATIENT)
Dept: PERIOP | Facility: HOSPITAL | Age: 63
End: 2018-09-14
Payer: COMMERCIAL

## 2018-09-14 ENCOUNTER — APPOINTMENT (OUTPATIENT)
Dept: RADIOLOGY | Facility: HOSPITAL | Age: 63
End: 2018-09-14
Payer: COMMERCIAL

## 2018-09-14 ENCOUNTER — HOSPITAL ENCOUNTER (OUTPATIENT)
Dept: RADIOLOGY | Facility: HOSPITAL | Age: 63
Setting detail: OUTPATIENT SURGERY
Discharge: HOME | End: 2018-09-14
Payer: COMMERCIAL

## 2018-09-14 VITALS
SYSTOLIC BLOOD PRESSURE: 126 MMHG | RESPIRATION RATE: 16 BRPM | HEART RATE: 91 BPM | DIASTOLIC BLOOD PRESSURE: 57 MMHG | TEMPERATURE: 98.7 F | BODY MASS INDEX: 22.84 KG/M2 | HEIGHT: 64 IN | WEIGHT: 133.8 LBS | OXYGEN SATURATION: 96 %

## 2018-09-14 DIAGNOSIS — Z98.890 S/P FOOT SURGERY, LEFT: Primary | ICD-10-CM

## 2018-09-14 DIAGNOSIS — M20.22 HALLUX RIGIDUS OF LEFT FOOT: ICD-10-CM

## 2018-09-14 PROCEDURE — 73620 X-RAY EXAM OF FOOT: CPT

## 2018-09-14 PROCEDURE — C1713 ANCHOR/SCREW BN/BN,TIS/BN: HCPCS | Performed by: PODIATRIST

## 2018-09-14 PROCEDURE — 73630 X-RAY EXAM OF FOOT: CPT

## 2018-09-14 DEVICE — SPEED TITAN 18X15X15MM IMPLANT KIT
Type: IMPLANTABLE DEVICE | Site: FOOT | Status: FUNCTIONAL
Brand: SPEED TITAN

## 2018-09-14 RX ORDER — PROPOFOL 10 MG/ML
INJECTION, EMULSION INTRAVENOUS AS NEEDED
Status: DISCONTINUED | OUTPATIENT
Start: 2018-09-14 | End: 2018-09-14 | Stop reason: SURG

## 2018-09-14 RX ORDER — BUPIVACAINE HYDROCHLORIDE 5 MG/ML
INJECTION, SOLUTION PERINEURAL AS NEEDED
Status: DISCONTINUED | OUTPATIENT
Start: 2018-09-14 | End: 2018-09-14 | Stop reason: HOSPADM

## 2018-09-14 RX ORDER — SODIUM CHLORIDE 9 MG/ML
125 INJECTION, SOLUTION INTRAVENOUS CONTINUOUS
Status: DISCONTINUED | OUTPATIENT
Start: 2018-09-14 | End: 2018-09-14 | Stop reason: HOSPADM

## 2018-09-14 RX ORDER — FENTANYL CITRATE 50 UG/ML
INJECTION, SOLUTION INTRAMUSCULAR; INTRAVENOUS AS NEEDED
Status: DISCONTINUED | OUTPATIENT
Start: 2018-09-14 | End: 2018-09-14 | Stop reason: SURG

## 2018-09-14 RX ORDER — ONDANSETRON 2 MG/ML
4 INJECTION INTRAMUSCULAR; INTRAVENOUS ONCE AS NEEDED
Status: DISCONTINUED | OUTPATIENT
Start: 2018-09-14 | End: 2018-09-14 | Stop reason: HOSPADM

## 2018-09-14 RX ORDER — MEPERIDINE HYDROCHLORIDE 50 MG/ML
12.5 INJECTION INTRAMUSCULAR; INTRAVENOUS; SUBCUTANEOUS ONCE AS NEEDED
Status: DISCONTINUED | OUTPATIENT
Start: 2018-09-14 | End: 2018-09-14 | Stop reason: HOSPADM

## 2018-09-14 RX ORDER — ONDANSETRON 2 MG/ML
INJECTION INTRAMUSCULAR; INTRAVENOUS AS NEEDED
Status: DISCONTINUED | OUTPATIENT
Start: 2018-09-14 | End: 2018-09-14 | Stop reason: SURG

## 2018-09-14 RX ORDER — MIDAZOLAM HYDROCHLORIDE 1 MG/ML
INJECTION INTRAMUSCULAR; INTRAVENOUS AS NEEDED
Status: DISCONTINUED | OUTPATIENT
Start: 2018-09-14 | End: 2018-09-14 | Stop reason: SURG

## 2018-09-14 RX ORDER — OXYCODONE HYDROCHLORIDE AND ACETAMINOPHEN 5; 325 MG/1; MG/1
1 TABLET ORAL EVERY 4 HOURS PRN
Qty: 35 TABLET | Refills: 0 | Status: SHIPPED | OUTPATIENT
Start: 2018-09-14 | End: 2018-09-27 | Stop reason: HOSPADM

## 2018-09-14 RX ORDER — FENTANYL CITRATE/PF 50 MCG/ML
50 SYRINGE (ML) INJECTION
Status: DISCONTINUED | OUTPATIENT
Start: 2018-09-14 | End: 2018-09-14 | Stop reason: HOSPADM

## 2018-09-14 RX ORDER — EPHEDRINE SULFATE 50 MG/ML
INJECTION, SOLUTION INTRAVENOUS AS NEEDED
Status: DISCONTINUED | OUTPATIENT
Start: 2018-09-14 | End: 2018-09-14 | Stop reason: SURG

## 2018-09-14 RX ORDER — OXYCODONE HYDROCHLORIDE AND ACETAMINOPHEN 5; 325 MG/1; MG/1
1 TABLET ORAL EVERY 4 HOURS PRN
Status: DISCONTINUED | OUTPATIENT
Start: 2018-09-14 | End: 2018-09-14 | Stop reason: HOSPADM

## 2018-09-14 RX ADMIN — EPHEDRINE SULFATE 10 MG: 50 INJECTION, SOLUTION INTRAMUSCULAR; INTRAVENOUS; SUBCUTANEOUS at 08:45

## 2018-09-14 RX ADMIN — FENTANYL CITRATE 50 MCG: 50 INJECTION, SOLUTION INTRAMUSCULAR; INTRAVENOUS at 09:10

## 2018-09-14 RX ADMIN — FENTANYL CITRATE 50 MCG: 50 INJECTION INTRAMUSCULAR; INTRAVENOUS at 10:21

## 2018-09-14 RX ADMIN — SODIUM CHLORIDE: 0.9 INJECTION, SOLUTION INTRAVENOUS at 08:25

## 2018-09-14 RX ADMIN — EPHEDRINE SULFATE 10 MG: 50 INJECTION, SOLUTION INTRAMUSCULAR; INTRAVENOUS; SUBCUTANEOUS at 08:42

## 2018-09-14 RX ADMIN — SODIUM CHLORIDE: 0.9 INJECTION, SOLUTION INTRAVENOUS at 09:45

## 2018-09-14 RX ADMIN — ONDANSETRON HYDROCHLORIDE 4 MG: 2 INJECTION, SOLUTION INTRAVENOUS at 09:44

## 2018-09-14 RX ADMIN — FENTANYL CITRATE 50 MCG: 50 INJECTION, SOLUTION INTRAMUSCULAR; INTRAVENOUS at 08:31

## 2018-09-14 RX ADMIN — FENTANYL CITRATE 50 MCG: 50 INJECTION INTRAMUSCULAR; INTRAVENOUS at 10:29

## 2018-09-14 RX ADMIN — SODIUM CHLORIDE 125 ML/HR: 0.9 INJECTION, SOLUTION INTRAVENOUS at 07:55

## 2018-09-14 RX ADMIN — OXYCODONE AND ACETAMINOPHEN 1 TABLET: 5; 325 TABLET ORAL at 11:35

## 2018-09-14 RX ADMIN — CEFAZOLIN SODIUM 1000 MG: 1 SOLUTION INTRAVENOUS at 08:30

## 2018-09-14 RX ADMIN — MIDAZOLAM 2 MG: 1 INJECTION INTRAMUSCULAR; INTRAVENOUS at 08:31

## 2018-09-14 RX ADMIN — DEXAMETHASONE SODIUM PHOSPHATE 8 MG: 10 INJECTION INTRAMUSCULAR; INTRAVENOUS at 09:45

## 2018-09-14 RX ADMIN — PROPOFOL 150 MG: 10 INJECTION, EMULSION INTRAVENOUS at 08:31

## 2018-09-14 NOTE — OP NOTE
OPERATIVE REPORT  PATIENT NAME: Jeromy Montero    :  [birth year]  MRN: [id number]  Pt Location: [facility] OR ROOM 02    SURGERY DATE: 2018    Surgeon(s) and Role:     * Glenroy Ulrich DPM - Primary     * Estrada Hameed DPM - Assisting    Preop Diagnosis:  Hallux rigidus of left foot [M20 22]    Post-Op Diagnosis Codes:     * Hallux rigidus of left foot [M20 22]    Procedure(s) (LRB):  1ST MTPJ FUSION (Left)    Specimen(s):  None     Estimated Blood Loss:   Minimal    Drains:  None     Anesthesia Type:   LMA  20cc local anesthesia 1:1 mixture of 1% lidocaine plain and 0 5% marcaine plain     Hemostasis:   Anatomical dissection   72 minutes on L PNAT at 250 mmHg     Materials:   Synthes staples x2  2-0 vicryl   3-0 vicryl   4-0 nylon     Injectables:   10cc of 0 5% marcaine plain     Operative Indications:  Hallux rigidus of left foot [M20 22]    Operative Findings:  Consistent with diagnosis; 100% cartilage defect to metatarsal head and proximal phalanx base, dorsal medial and lateral spurring     Complications:   None    Procedure and Technique:  Under mild sedation, the patient was brought into the operating room and placed on the operating room table in the supine position  A pneumatic ankle tourniquet was then placed around the patient's left ankle with ample webril padding  A time out was performed to confirm the correct patient, procedure and site with all parties in agreement  Following LMA, local anesthetic was obtained about the patient's left foot in a maddox block type fashion was performed consisting of 20 ml of 1% lidocaine plain and 0 5% bupivacaine plain in a 1:1 mixture  The foot was then scrubbed, prepped and draped in the usual aseptic manner  An esmarch bandage was utilized to exsangunate the patients foot and the pneumatic ankle tourniquet was then inflated  The esmarch bandage was removed and the foot was placed on the operating room table      Attention was directed to dorsomedial aspect of the left foot where an approximately 6cm dorsolinear incision was made over the first MTPJ  This incision was deepened to subcutaneous tissue with a sterile 15 blade  All vital neurovascular structures were identified and then retracted, all bleeders were identified and cauterized  The EHL was then identified and retracted laterally  A linear capsular incision was then made and all capsular and periosteal structures were reflected off of the 1st metatarsal head and the base of the proximal phalanx, all soft tissue attachments were reflected off the 1st metatarsal head and base of the proximal phalanx  Upon visualization of the MTPJ, it was noted that there was complete cartilage destruction to the entire metatarsal head and and base of proximal phalanx with prominent osteophyte formation circumferentially around the joint and scattered bony fragments  A cup and cone reamer was then utilized to prepare the joint for fusion  Any extra bone was cleared with rongeur  A 0 045 k-wire was used for temporary reduction to properly position the joint which was visualized and assured using C-arm  Using proper manufacturing protocol, two Synthes staples were placed across the fusion site medially and laterally without complications and good with good compression  Adequate reduction of the joint was confirmed radiologically and clinically  The wound was cleansed with copious amounts of sterile saline with bulb syringe  Capsular closure was then obtained utilizing 2-0 Vicryl  Subcutaneous tissues were then reapproximated utilizing 3-0 Vicryl  Skin closure was then obtained utilizing 4-0 placed in a horizontal mattress type of fashion  A postoperative injection consisting of 10 ml of 0 5% bupivacaine plain was performed  The incision site was then dressed with adaptic, dry sterile dressing, and ACE wrap  The pneumatic ankle tourniquet was deflated at 72 minutes and normal hyperemic flush was noted to all digits   The patient tolerated the procedure and anesthesia well and was transported to the PACU with vital signs stable       I was present for the entire procedure    Patient Disposition:  PACU  and hemodynamically stable    SIGNATURE: Jasmyn Frausto DPM  DATE: September 14, 2018  TIME: 10:07 AM

## 2018-09-14 NOTE — TELEPHONE ENCOUNTER
WHEN PT WAS HERE ON 09/12/18 TO SEE DR Cain SCOTT SHE AGREED TO DO A FIT KIT BUT PT WAS DISCHARGED BEFORE IT COULD BE GIVEN TO HER  ATTEMPT MADE TO CONTACT PT BUT THERE WAS NO ANSWER SO VM WAS LEFT TO PT TO CALL US BACK  IF PT RETURNS CALL SHE JUST NEEDS TO PASS BY THE OFFICE AND PICK IT UP  IF NECESSARY A NURSE VISIT CAN BE SCHEDULE IF PT WANTS/NEEDS EXPLANATION

## 2018-09-14 NOTE — DISCHARGE SUMMARY
Discharge Summary Outpatient Procedure Podiatry -   Mohsen Jones 66 y o  female MRN: [id number]  Unit/Bed#: OR POOL Encounter: [id number]    Admission Date: 9/14/2018     Admitting Diagnosis: Hallux rigidus of left foot [M20 22]    Discharge Diagnosis: same    Procedures Performed: 1ST MTPJ FUSION: 23721 (CPT®) - LEFT    Complications: none    Condition at Discharge: stable    Discharge instructions/Information to patient and family:   See after visit summary for information provided to patient and family  Provisions for Follow-Up Care/Important appointments:  See after visit summary for information related to follow-up care and any pertinent home health orders  Discharge Medications:  See after visit summary for reconciled discharge medications provided to patient and family

## 2018-09-14 NOTE — H&P (VIEW-ONLY)
INTERNAL MEDICINE FOLLOW-UP OFFICE VISIT  [facility]  [address] [place] [address]    NAME: Susan Mayfield  AGE: 66 y o  SEX: female    DATE OF ENCOUNTER: 9/12/2018    Assessment and Plan     Diagnoses and all orders for this visit:    Screening for colon cancer  -     Occult Blood, Fecal Immunochemical; Future        Orders Placed This Encounter   Procedures    Occult Blood, Fecal Immunochemical       - Counseling Documentation: patient was counseled regarding: diagnostic results, instructions for management, risk factor reductions, prognosis, patient and family education, impressions, risks and benefits of treatment options and importance of compliance with treatment  - Counseling Time: counseling time more than 50% of visit: 30 minutes  - Barriers to treatment: No  - Medication Side Effects: Adverse side effects of medications were reviewed with the patient/guardian today  Chief Complaint     Chief Complaint   Patient presents with    Pre-op Exam     pt "foot Sx of the left foot"       History of Present Illness     66year old female with PMH of HTN presents for pre-operative clearance for surgery scheduled by Podiatry on 9/14/18  Surgery will be a left hallux fusion  Patient reports no SOB while walking up 4 flights of stairs or 2 blocks  Patient has no acute complaints at this time   Pre-Op Evaluation Assessment  66 y o  female with planned surgery Friday September 14th 2018 of the left big toe  Known risk factors for perioperative complications: None  Difficulty with intubation is not anticipated      Cardiac Risk Estimation: per the Revised Cardiac Risk Index (Circ  [id number], 1999), the patient's risk factors for cardiac complications include NONE OF THE FOLLOOWING "high-risk" surgery (intraperitoneal, intrathoracic, or suprainguinal vascular), history of cerebrovascular disease, history of congestive heart failure, history of ischemic heart disease, on insulin and serum Cr > 2 0 mg/dL, putting her in: RCI RISK CLASS I (0 risk factors, risk of major cardiac compl  appr  0 5%)  Current medications which may produce withdrawal symptoms if withheld perioperatively: None  Pre-Op Evaluation Plan  1  Preoperative workup as follows none  2  Change in medication regimen before surgery: none, continue medication regimen including morning of surgery, with sip of water  3  Prophylaxis for cardiac events with perioperative beta-blockers: not indicated  4  Invasive hemodynamic monitoring perioperatively: not indicated  5  Deep vein thrombosis prophylaxis postoperatively:intermittent pneumatic compression boots  6  Surveillance for postoperative MI with ECG immediately postoperatively and on postoperative days 1 and 2 AND troponin levels 24 hours postoperatively and on day 4 or hospital discharge (whichever comes first): not indicated  7  Other measures: None    The following portions of the patient's history were reviewed and updated as appropriate: allergies, current medications, past family history, past medical history, past social history, past surgical history and problem list     Patient is at low risk for cardiac complications undergoing a low risk procedure and thus can proceed to surgery w/o any further workup  Review of Systems     Review of Systems   Constitutional: Negative for activity change, appetite change, chills, diaphoresis, fever and unexpected weight change  HENT: Negative for congestion, facial swelling and rhinorrhea  Eyes: Negative for photophobia and visual disturbance  Respiratory: Negative for cough, shortness of breath and wheezing  Cardiovascular: Negative for chest pain and palpitations  Gastrointestinal: Negative for abdominal pain, blood in stool, constipation, diarrhea, nausea and vomiting     Genitourinary: Negative for decreased urine volume, difficulty urinating, dysuria, flank pain, frequency, hematuria and urgency  Musculoskeletal: Negative for arthralgias, back pain, joint swelling and myalgias  Neurological: Negative for dizziness, syncope, facial asymmetry, light-headedness, numbness and headaches  Psychiatric/Behavioral: Negative for confusion and decreased concentration  The patient is not nervous/anxious  Active Problem List     Patient Active Problem List   Diagnosis    Other complications due to other internal orthopedic device, implant, and graft    Fever    Osteoarthritis    Hypertension    Acute blood loss anemia    Hematoma of thigh    Complication of internal fixation device (HCC)    Status post hip surgery       Objective     /78 (BP Location: Left arm, Patient Position: Sitting, Cuff Size: Adult)   Pulse 88   Temp 99 °F (37 2 °C) (Oral)   Ht 5' 3 5" (1 613 m)   Wt 62 kg (136 lb 11 oz)   BMI 23 83 kg/m²     Physical Exam   Constitutional: She is oriented to person, place, and time  She appears well-developed and well-nourished  No distress  HENT:   Head: Normocephalic and atraumatic  Nose: Nose normal    Mouth/Throat: Oropharynx is clear and moist  No oropharyngeal exudate  Eyes: EOM are normal  Pupils are equal, round, and reactive to light  Right eye exhibits no discharge  Left eye exhibits no discharge  No scleral icterus  Neck: Normal range of motion  Neck supple  No JVD present  Cardiovascular: Normal rate, regular rhythm, normal heart sounds and intact distal pulses  Exam reveals no gallop and no friction rub  No murmur heard  Pulmonary/Chest: Effort normal and breath sounds normal  No respiratory distress  She has no wheezes  She has no rales  She exhibits no tenderness  Abdominal: Soft  Bowel sounds are normal  She exhibits no distension and no mass  There is no tenderness  There is no rebound and no guarding  Musculoskeletal: Normal range of motion  She exhibits no edema, tenderness or deformity     Lymphadenopathy:     She has no cervical adenopathy  Neurological: She is alert and oriented to person, place, and time  She has normal reflexes  Skin: Skin is warm and dry  No rash noted  She is not diaphoretic  No erythema  No pallor  Psychiatric: She has a normal mood and affect   Her behavior is normal        Pertinent Laboratory/Diagnostic Studies:  CBC:   Lab Results   Component Value Date/Time    WBC 5 84 09/05/2018 01:51 PM    WBC 4 81 10/23/2015 01:09 PM    RBC 4 11 09/05/2018 01:51 PM    RBC 4 49 10/23/2015 01:09 PM    HGB 12 4 09/05/2018 01:51 PM    HGB 13 5 10/23/2015 01:09 PM    HCT 39 2 09/05/2018 01:51 PM    HCT 41 8 10/23/2015 01:09 PM    MCV 95 09/05/2018 01:51 PM    MCV 93 10/23/2015 01:09 PM    MCH 30 2 09/05/2018 01:51 PM    MCH 30 1 10/23/2015 01:09 PM    MCHC 31 6 09/05/2018 01:51 PM    MCHC 32 3 10/23/2015 01:09 PM    RDW 13 2 09/05/2018 01:51 PM    RDW 13 5 10/23/2015 01:09 PM    MPV 9 7 09/05/2018 01:51 PM    MPV 9 6 10/23/2015 01:09 PM     09/05/2018 01:51 PM     10/23/2015 01:09 PM    NRBC 0 09/05/2018 01:51 PM    NEUTOPHILPCT 50 09/05/2018 01:51 PM    NEUTOPHILPCT 52 10/23/2015 01:09 PM    LYMPHOPCT 42 09/05/2018 01:51 PM    LYMPHOPCT 39 10/23/2015 01:09 PM    MONOPCT 5 09/05/2018 01:51 PM    MONOPCT 6 10/23/2015 01:09 PM    EOSPCT 2 09/05/2018 01:51 PM    EOSPCT 2 10/23/2015 01:09 PM    BASOPCT 1 09/05/2018 01:51 PM    BASOPCT 1 10/23/2015 01:09 PM    NEUTROABS 2 93 09/05/2018 01:51 PM    NEUTROABS 2 50 10/23/2015 01:09 PM    LYMPHSABS 2 44 09/05/2018 01:51 PM    LYMPHSABS 1 88 10/23/2015 01:09 PM    MONOSABS 0 28 09/05/2018 01:51 PM    MONOSABS 0 29 10/23/2015 01:09 PM    EOSABS 0 10 09/05/2018 01:51 PM    EOSABS 0 10 10/23/2015 01:09 PM     CBC:   Results from last 6 Months  Lab Units 09/05/18  1351   WBC Thousand/uL 5 84   RBC Million/uL 4 11   HEMOGLOBIN g/dL 12 4   HEMATOCRIT % 39 2   MCV fL 95   MCH pg 30 2   MCHC g/dL 31 6   RDW % 13 2   MPV fL 9 7   PLATELETS Thousands/uL 307   NRBC AUTO /100 WBCs 0 NEUTROS PCT % 50   LYMPHS PCT % 42   MONOS PCT % 5   EOS PCT % 2   BASOS PCT % 1   NEUTROS ABS Thousands/µL 2 93   LYMPHS ABS Thousands/µL 2 44   MONOS ABS Thousand/µL 0 28   EOS ABS Thousand/µL 0 10       Current Medications     Current Outpatient Prescriptions:     acetaminophen-codeine (TYLENOL #3) 300-30 mg per tablet, Take 1 tablet by mouth, Disp: , Rfl:     alendronate (FOSAMAX) 70 mg tablet, Take 1 tablet by mouth once a week, Disp: , Rfl:     amLODIPine (NORVASC) 5 mg tablet, Take 1 tablet by mouth daily, Disp: , Rfl:     Calcium Carbonate-Vitamin D (CALCIUM 600+D HIGH POTENCY) 600-400 MG-UNIT per tablet, Take by mouth, Disp: , Rfl:     ibuprofen (MOTRIN) 600 mg tablet, Take 1 tablet by mouth every 6 (six) hours as needed for mild pain for up to 10 days, Disp: 30 tablet, Rfl: 0    Health Maintenance     Health Maintenance   Topic Date Due    Depression Screening PHQ  1955    CRC Screening: Colonoscopy  1955    INFLUENZA VACCINE  10/12/2018 (Originally 9/1/2018)    DTaP,Tdap,and Td Vaccines (1 - Tdap) 10/12/2018 (Originally 1/2/1995)    Pneumococcal PPSV23 Medium Risk Adult (1 of 1 - PPSV23) 10/12/2018 (Originally 8/19/1976)     Immunization History   Administered Date(s) Administered    Td (adult), adsorbed 01/01/1995    Tuberculin Skin Test-PPD Intradermal 06/28/2004       Gagandeep SAMSON  Internal Medicine PGY-2  9/12/2018 1:58 PM

## 2018-09-14 NOTE — DISCHARGE INSTRUCTIONS
Dr Wray Grade, DPM  Post-op surgery Instructions    Pain / Swelling   There is expected to be some discomfort, swelling and bruising of the foot  You might see some blood on the bandage  This is not a cause for alarm  However, if there is active or persistent bleeding (blood running out of the bandage while at rest) - call the office at once (or) go to a [address] [place] [address] ER and ask them to page the podiatry residents   Apply an ice bag to the top of your ankle for 30 minutes for each waking hour, for the first 72 hours  This should be discontinued when sleeping  This will also work through your cast if you have one  Ice must not leak and wet the dressings  Also, using the ice inappropriately can cause permanent nerve damage   Your foot should be elevated as much as possible for the first 72 hours  The foot should be above heart level  If your foot is below heart level, throbbing and pain will increase  When sleeping, elevation can be accomplished by putting a small hard suitcase between the box spring and mattress at the foot of the bed  Walking and standing will increase pain, throbbing and bleeding   Persistent pain despite elevation and your pain meds can many times be relieved by removing the tight brown compression layer (called the ACE wrap) that is over the white gauze dressing  If you are elevating and taking your pain meds and pain is still severe, remove this brown stretchy layer but leave the gauze intact  Wait 30 minutes  If the pain subsides, reapply the ACE so its not so tight  If pain doesnt get better, call your doctor  Dressings / Casts   Do not remove your surgical dressings - they will be changed at your doctor appointment  Do not allow surgical dressings to get wet  Sponge baths should be used until the sutures are removed  Do not try to keep the foot dry using a garbage bag and tape - this rarely works    If you get your dressings or cast wet - call your doctor immediately   If your cast or dressings feel tight - elevate your foot for 30 minutes  If this doesnt help and you feel tingling or see toe discoloration - call your doctor or go to a [address] [place] [address] ER and ask them to page the podiatry residents   Do not put things in your cast such as powder, coat hangers to scratch, etc  This can cause skin damage and infections  Infection   If you have a fever at or above 100 degrees, chills, sweats, or see red streaks rising above the dressing or smell odor / see pus (creamy white drainage), call your doctor immediately or go to a [address] [place] [address] ER and ask them to page the podiatry residents  Constipation   If you have severe constipation after surgery, this can be due to the pain medication  Notify your doctor and special medication will be prescribed to deal with this  Blood Clots   If you had surgery and are in a cast, have an external fixation device, or are non-weightbearing using crutches, a knee scooter, a wheelchair, a walker, or an iWalk device - you need to be on a blood thinner  Your doctor will prescribe one of the regimens below  If you run out of the blood thinner checked off below before you are walking normally on your foot and out of your cast - notify your doctor immediately so you can get a refill  Not doing so can lead to blood clots and serious complications including death  Numbness   It is normal for your foot to be numb until about dinner time  If youve had a popliteal block procedure, you might be numb until the following day  When you start to feel pins and needles in the foot - this means the block is wearing off  That is the appropriate time to take your pain medication  Pain Medication   Do not supplement your pain medication with over the counter drugs, old leftover pain medications, or extra Tylenol  You must discuss any additional medications with your doctor prior to taking them for pain     Driving   No driving is allowed without discussion with the doctor  Ambulation   Nonweightbearing to surgical foot in surgical shoe    If given a flat, stiff shoe / darco wedge shoe, Do not walk at all without it   Use a device (cane, walker, crutches) to take some weight off of the foot when walking   If instructed not to put weight on the surgical foot, use the following:  o Crutches and Rolling Walker     o Putting weight on the foot will lead to complications

## 2018-09-14 NOTE — TELEPHONE ENCOUNTER
Patient returned call - READ TASK TO THE PATIENT - patient had surgery today - will come in to  the [facility] when she is able

## 2018-09-24 ENCOUNTER — APPOINTMENT (EMERGENCY)
Dept: RADIOLOGY | Facility: HOSPITAL | Age: 63
DRG: 193 | End: 2018-09-24
Payer: COMMERCIAL

## 2018-09-24 ENCOUNTER — HOSPITAL ENCOUNTER (INPATIENT)
Facility: HOSPITAL | Age: 63
LOS: 3 days | Discharge: HOME | DRG: 193 | End: 2018-09-27
Attending: EMERGENCY MEDICINE | Admitting: INTERNAL MEDICINE
Payer: COMMERCIAL

## 2018-09-24 DIAGNOSIS — Z72.0 TOBACCO ABUSE: ICD-10-CM

## 2018-09-24 DIAGNOSIS — R09.02 HYPOXIA: ICD-10-CM

## 2018-09-24 DIAGNOSIS — J18.9 COMMUNITY ACQUIRED PNEUMONIA OF RIGHT UPPER LOBE OF LUNG: Primary | ICD-10-CM

## 2018-09-24 PROBLEM — D64.9 ANEMIA: Status: ACTIVE | Noted: 2018-09-24

## 2018-09-24 LAB
ALBUMIN SERPL BCP-MCNC: 3.2 G/DL (ref 3.5–5)
ALP SERPL-CCNC: 105 U/L (ref 46–116)
ALT SERPL W P-5'-P-CCNC: 26 U/L (ref 12–78)
ANION GAP SERPL CALCULATED.3IONS-SCNC: 7 MMOL/L (ref 4–13)
APTT PPP: 36 SECONDS (ref 24–36)
AST SERPL W P-5'-P-CCNC: 14 U/L (ref 5–45)
BASOPHILS # BLD AUTO: 0.07 THOUSANDS/ΜL (ref 0–0.1)
BASOPHILS NFR BLD AUTO: 1 % (ref 0–1)
BILIRUB SERPL-MCNC: 0.4 MG/DL (ref 0.2–1)
BUN SERPL-MCNC: 17 MG/DL (ref 5–25)
CALCIUM SERPL-MCNC: 8.5 MG/DL (ref 8.3–10.1)
CHLORIDE SERPL-SCNC: 102 MMOL/L (ref 100–108)
CO2 SERPL-SCNC: 29 MMOL/L (ref 21–32)
CREAT SERPL-MCNC: 1.06 MG/DL (ref 0.6–1.3)
EOSINOPHIL # BLD AUTO: 0.2 THOUSAND/ΜL (ref 0–0.61)
EOSINOPHIL NFR BLD AUTO: 2 % (ref 0–6)
ERYTHROCYTE [DISTWIDTH] IN BLOOD BY AUTOMATED COUNT: 13.2 % (ref 11.6–15.1)
FERRITIN SERPL-MCNC: 171 NG/ML (ref 8–388)
FOLATE SERPL-MCNC: >20 NG/ML (ref 3.1–17.5)
GFR SERPL CREATININE-BSD FRML MDRD: 57 ML/MIN/1.73SQ M
GLUCOSE SERPL-MCNC: 132 MG/DL (ref 65–140)
HCT VFR BLD AUTO: 29.2 % (ref 34.8–46.1)
HCT VFR BLD AUTO: 30 % (ref 34.8–46.1)
HGB BLD-MCNC: 9.2 G/DL (ref 11.5–15.4)
HGB BLD-MCNC: 9.5 G/DL (ref 11.5–15.4)
IMM GRANULOCYTES # BLD AUTO: 0.06 THOUSAND/UL (ref 0–0.2)
IMM GRANULOCYTES NFR BLD AUTO: 1 % (ref 0–2)
INR PPP: 1.02 (ref 0.86–1.17)
IRON SERPL-MCNC: 11 UG/DL (ref 50–170)
LACTATE SERPL-SCNC: 0.5 MMOL/L (ref 0.5–2)
LYMPHOCYTES # BLD AUTO: 1.15 THOUSANDS/ΜL (ref 0.6–4.47)
LYMPHOCYTES NFR BLD AUTO: 9 % (ref 14–44)
MCH RBC QN AUTO: 30.4 PG (ref 26.8–34.3)
MCHC RBC AUTO-ENTMCNC: 31.7 G/DL (ref 31.4–37.4)
MCV RBC AUTO: 96 FL (ref 82–98)
MONOCYTES # BLD AUTO: 1 THOUSAND/ΜL (ref 0.17–1.22)
MONOCYTES NFR BLD AUTO: 8 % (ref 4–12)
NEUTROPHILS # BLD AUTO: 9.99 THOUSANDS/ΜL (ref 1.85–7.62)
NEUTS SEG NFR BLD AUTO: 79 % (ref 43–75)
NRBC BLD AUTO-RTO: 0 /100 WBCS
PLATELET # BLD AUTO: 266 THOUSANDS/UL (ref 149–390)
PLATELET # BLD AUTO: 281 THOUSANDS/UL (ref 149–390)
PMV BLD AUTO: 10.1 FL (ref 8.9–12.7)
PMV BLD AUTO: 10.1 FL (ref 8.9–12.7)
POTASSIUM SERPL-SCNC: 3.6 MMOL/L (ref 3.5–5.3)
PROCALCITONIN SERPL-MCNC: 0.15 NG/ML
PROT SERPL-MCNC: 6.7 G/DL (ref 6.4–8.2)
PROTHROMBIN TIME: 13.1 SECONDS (ref 11.8–14.2)
RBC # BLD AUTO: 3.12 MILLION/UL (ref 3.81–5.12)
SODIUM SERPL-SCNC: 138 MMOL/L (ref 136–145)
TIBC SERPL-MCNC: 229 UG/DL (ref 250–450)
VIT B12 SERPL-MCNC: 1459 PG/ML (ref 100–900)
WBC # BLD AUTO: 12.47 THOUSAND/UL (ref 4.31–10.16)

## 2018-09-24 PROCEDURE — 82607 VITAMIN B-12: CPT | Performed by: PHYSICIAN ASSISTANT

## 2018-09-24 PROCEDURE — 85018 HEMOGLOBIN: CPT | Performed by: PHYSICIAN ASSISTANT

## 2018-09-24 PROCEDURE — 36415 COLL VENOUS BLD VENIPUNCTURE: CPT | Performed by: EMERGENCY MEDICINE

## 2018-09-24 PROCEDURE — 94760 N-INVAS EAR/PLS OXIMETRY 1: CPT

## 2018-09-24 PROCEDURE — 83605 ASSAY OF LACTIC ACID: CPT | Performed by: EMERGENCY MEDICINE

## 2018-09-24 PROCEDURE — 87040 BLOOD CULTURE FOR BACTERIA: CPT | Performed by: EMERGENCY MEDICINE

## 2018-09-24 PROCEDURE — 96361 HYDRATE IV INFUSION ADD-ON: CPT

## 2018-09-24 PROCEDURE — 83550 IRON BINDING TEST: CPT | Performed by: PHYSICIAN ASSISTANT

## 2018-09-24 PROCEDURE — 99284 EMERGENCY DEPT VISIT MOD MDM: CPT

## 2018-09-24 PROCEDURE — 85025 COMPLETE CBC W/AUTO DIFF WBC: CPT | Performed by: EMERGENCY MEDICINE

## 2018-09-24 PROCEDURE — 80053 COMPREHEN METABOLIC PANEL: CPT | Performed by: EMERGENCY MEDICINE

## 2018-09-24 PROCEDURE — 82746 ASSAY OF FOLIC ACID SERUM: CPT | Performed by: PHYSICIAN ASSISTANT

## 2018-09-24 PROCEDURE — 99222 1ST HOSP IP/OBS MODERATE 55: CPT | Performed by: INTERNAL MEDICINE

## 2018-09-24 PROCEDURE — 87449 NOS EACH ORGANISM AG IA: CPT | Performed by: PHYSICIAN ASSISTANT

## 2018-09-24 PROCEDURE — 82728 ASSAY OF FERRITIN: CPT | Performed by: PHYSICIAN ASSISTANT

## 2018-09-24 PROCEDURE — 83540 ASSAY OF IRON: CPT | Performed by: PHYSICIAN ASSISTANT

## 2018-09-24 PROCEDURE — 94668 MNPJ CHEST WALL SBSQ: CPT

## 2018-09-24 PROCEDURE — 85610 PROTHROMBIN TIME: CPT | Performed by: EMERGENCY MEDICINE

## 2018-09-24 PROCEDURE — 84145 PROCALCITONIN (PCT): CPT | Performed by: PHYSICIAN ASSISTANT

## 2018-09-24 PROCEDURE — 85730 THROMBOPLASTIN TIME PARTIAL: CPT | Performed by: EMERGENCY MEDICINE

## 2018-09-24 PROCEDURE — 85049 AUTOMATED PLATELET COUNT: CPT | Performed by: PHYSICIAN ASSISTANT

## 2018-09-24 PROCEDURE — 71046 X-RAY EXAM CHEST 2 VIEWS: CPT

## 2018-09-24 PROCEDURE — 94664 DEMO&/EVAL PT USE INHALER: CPT

## 2018-09-24 PROCEDURE — 96374 THER/PROPH/DIAG INJ IV PUSH: CPT

## 2018-09-24 PROCEDURE — 85014 HEMATOCRIT: CPT | Performed by: PHYSICIAN ASSISTANT

## 2018-09-24 RX ORDER — SODIUM CHLORIDE 9 MG/ML
75 INJECTION, SOLUTION INTRAVENOUS CONTINUOUS
Status: DISCONTINUED | OUTPATIENT
Start: 2018-09-24 | End: 2018-09-27

## 2018-09-24 RX ORDER — NICOTINE 21 MG/24HR
1 PATCH, TRANSDERMAL 24 HOURS TRANSDERMAL DAILY
Status: DISCONTINUED | OUTPATIENT
Start: 2018-09-24 | End: 2018-09-27 | Stop reason: HOSPADM

## 2018-09-24 RX ORDER — LEVALBUTEROL 1.25 MG/.5ML
1.25 SOLUTION, CONCENTRATE RESPIRATORY (INHALATION) EVERY 8 HOURS PRN
Status: DISCONTINUED | OUTPATIENT
Start: 2018-09-24 | End: 2018-09-27 | Stop reason: HOSPADM

## 2018-09-24 RX ORDER — OXYCODONE HYDROCHLORIDE AND ACETAMINOPHEN 5; 325 MG/1; MG/1
1 TABLET ORAL EVERY 4 HOURS PRN
Status: DISCONTINUED | OUTPATIENT
Start: 2018-09-24 | End: 2018-09-27 | Stop reason: HOSPADM

## 2018-09-24 RX ORDER — ONDANSETRON 2 MG/ML
4 INJECTION INTRAMUSCULAR; INTRAVENOUS EVERY 4 HOURS PRN
Status: DISCONTINUED | OUTPATIENT
Start: 2018-09-24 | End: 2018-09-27 | Stop reason: HOSPADM

## 2018-09-24 RX ORDER — ALBUTEROL SULFATE 90 UG/1
2 AEROSOL, METERED RESPIRATORY (INHALATION) EVERY 4 HOURS PRN
Status: DISCONTINUED | OUTPATIENT
Start: 2018-09-24 | End: 2018-09-27 | Stop reason: HOSPADM

## 2018-09-24 RX ORDER — ACETAMINOPHEN 325 MG/1
650 TABLET ORAL EVERY 6 HOURS PRN
Status: DISCONTINUED | OUTPATIENT
Start: 2018-09-24 | End: 2018-09-27 | Stop reason: HOSPADM

## 2018-09-24 RX ORDER — AZITHROMYCIN 250 MG/1
250 TABLET, FILM COATED ORAL EVERY 24 HOURS
Status: DISCONTINUED | OUTPATIENT
Start: 2018-09-25 | End: 2018-09-27

## 2018-09-24 RX ORDER — BENZONATATE 100 MG/1
100 CAPSULE ORAL 3 TIMES DAILY
Status: DISCONTINUED | OUTPATIENT
Start: 2018-09-24 | End: 2018-09-27 | Stop reason: HOSPADM

## 2018-09-24 RX ORDER — KETOROLAC TROMETHAMINE 30 MG/ML
15 INJECTION, SOLUTION INTRAMUSCULAR; INTRAVENOUS ONCE
Status: DISCONTINUED | OUTPATIENT
Start: 2018-09-24 | End: 2018-09-24

## 2018-09-24 RX ORDER — GUAIFENESIN 600 MG
600 TABLET, EXTENDED RELEASE 12 HR ORAL EVERY 12 HOURS SCHEDULED
Status: DISCONTINUED | OUTPATIENT
Start: 2018-09-24 | End: 2018-09-25

## 2018-09-24 RX ORDER — SODIUM CHLORIDE FOR INHALATION 0.9 %
3 VIAL, NEBULIZER (ML) INHALATION EVERY 8 HOURS PRN
Status: DISCONTINUED | OUTPATIENT
Start: 2018-09-24 | End: 2018-09-27 | Stop reason: HOSPADM

## 2018-09-24 RX ADMIN — ENOXAPARIN SODIUM 40 MG: 40 INJECTION SUBCUTANEOUS at 15:30

## 2018-09-24 RX ADMIN — CEFTRIAXONE 1000 MG: 1 INJECTION, POWDER, FOR SOLUTION INTRAMUSCULAR; INTRAVENOUS at 13:46

## 2018-09-24 RX ADMIN — ALBUTEROL SULFATE 2 PUFF: 90 AEROSOL, METERED RESPIRATORY (INHALATION) at 22:51

## 2018-09-24 RX ADMIN — NICOTINE 1 PATCH: 21 PATCH, EXTENDED RELEASE TRANSDERMAL at 15:30

## 2018-09-24 RX ADMIN — GUAIFENESIN 600 MG: 600 TABLET, EXTENDED RELEASE ORAL at 21:02

## 2018-09-24 RX ADMIN — BENZONATATE 100 MG: 100 CAPSULE ORAL at 21:28

## 2018-09-24 RX ADMIN — SODIUM CHLORIDE 1000 ML: 0.9 INJECTION, SOLUTION INTRAVENOUS at 12:57

## 2018-09-24 RX ADMIN — SODIUM CHLORIDE 75 ML/HR: 0.9 INJECTION, SOLUTION INTRAVENOUS at 16:57

## 2018-09-24 RX ADMIN — Medication 1 SPRAY: at 21:03

## 2018-09-24 RX ADMIN — AZITHROMYCIN MONOHYDRATE 500 MG: 500 INJECTION, POWDER, LYOPHILIZED, FOR SOLUTION INTRAVENOUS at 14:20

## 2018-09-24 RX ADMIN — ACETAMINOPHEN 650 MG: 325 TABLET, FILM COATED ORAL at 15:30

## 2018-09-24 RX ADMIN — NYSTATIN 500000 UNITS: 100000 SUSPENSION ORAL at 18:30

## 2018-09-24 NOTE — H&P
H&P- Ana Solano 1955, 66 y o  female MRN: [id number]    Unit/Bed#: -01 Encounter: [id number]    Primary Care Provider: Patricio Vee DO   Date and time admitted to hospital: 9/24/2018 12:20 PM    * Community acquired pneumonia of right lung   Assessment & Plan    · Patient presenting with respiratory symptoms of malaise, laryngitis, cough and subjective fever  · Noted to have right perihilar pneumonia on chest x-ray  Has leukocytosis but no other sepsis criteria at this time, except subjective fever prior to arrival  · Check procalcitonin level; check urine Legionella and strep  · Sputum culture if patient is able to expectorate  · Incentive spirometry  · Recommend ongoing treatment for community-acquired pneumonia with ceftriaxone and Zithromax as well as supportive care with IV fluids, oxygen, respiratory protocol, and cough medications/mucolytic regimen        Anemia   Assessment & Plan    · Patient noted to have substantial drop in her hemoglobin from 12 4 three weeks ago to 9 5 today    · No reports of bleeding noted; patient did sustain a mechanical fall on September 15th but does not have any visible evidence of hematoma  · Patient did have foot surgery on September 14th but I spoke directly with her podiatrist and there was no substantial blood loss from the surgery whatsoever  · Will recheck H&H today and anemia panel        Tobacco abuse   Assessment & Plan    · Patient has never had pulmonary function test to determine whether she may have underlying COPD but has longstanding > 40 year history of tobacco use  · Will provide nicotine patch          VTE Prophylaxis: Enoxaparin (Lovenox) unless any bleeding noted  / sequential compression device   Code Status:  Full code per discussion with the patient  POLST: POLST is not applicable to this patient  Discussion with family: daughter at bedside    Anticipated Length of Stay:  Patient will be admitted on an Inpatient basis with an anticipated length of stay of  Greater than 2 midnights  Justification for Hospital Stay: pneumonia    Total Time for Visit, including Counseling / Coordination of Care: 1 hour  Greater than 50% of this total time spent on direct patient counseling and coordination of care  Spoke with my attending and with her podiatrist    Chief Complaint:   "feeling miserable"    History of Present Illness:    Pearl Muniz is a 66 y o  female who presents with respiratory symptoms and feeling miserable  Ten days ago, patient had foot surgery and then went to stay with her daughter for help  Unfortunately that daughter had respiratory illness including bronchitis and an ear infection and the patient was exposed to her  Over this past weekend she started feeling ill including fatigue, dry cough, fever to 100, and chest congestion  She was not reporting any chest pain or shortness of breath  Of note, her other daughter at bedside states that the day after her foot surgery she tripped with her crutch and rather than put weight on her foot, fell flat forward on her face  She had an abrasion on the bridge of her nose but no other serious injury reported  Otherwise she has been doing very well from her foot surgery and states the pain has been controlled  Review of Systems:    Review of Systems   Constitutional: Positive for activity change, fatigue and fever  Negative for chills and diaphoresis  HENT: Positive for sore throat and voice change (hoarse/lost voice)  Negative for congestion, ear discharge, ear pain, facial swelling, nosebleeds, postnasal drip, rhinorrhea, sinus pain, sinus pressure and trouble swallowing  Respiratory: Positive for cough  Negative for choking, chest tightness, shortness of breath, wheezing and stridor  Cardiovascular: Negative for chest pain, palpitations and leg swelling  Gastrointestinal: Positive for diarrhea (notes somewhat looser stools x couple days)   Negative for abdominal distention, abdominal pain, anal bleeding, blood in stool, constipation, nausea and vomiting  Genitourinary: Negative for difficulty urinating, hematuria and vaginal bleeding  Musculoskeletal: Negative for arthralgias, back pain, gait problem, myalgias and neck pain  Skin: Positive for wound (mild abrasion on nose from fall)  Negative for color change, pallor and rash  Neurological: Positive for headaches (mild headache)  Negative for dizziness, weakness and light-headedness  Psychiatric/Behavioral: Positive for confusion (says she feels kind of "disoriented")  Past Medical and Surgical History:     Past Medical History:   Diagnosis Date    Anxiety     Arthritis     Chronic pain disorder     right hip    Hard of hearing     History of transfusion 05/01/2016    Hypertension     Osteoporosis     Wears dentures     Reports history of large right hip hematoma resulting in blood loss anemia which led to a transfusion several years ago    Past Surgical History:   Procedure Laterality Date    CARPAL TUNNEL RELEASE Right     FRACTURE SURGERY      Right hip ORIF    HIP SURGERY      NEUROPLASTY      Decompression Median Nerve At Carpal Tunnel    NE FUSION BIG TOE,MT-P JT Left 9/14/2018    Procedure: 1ST MTPJ FUSION;  Surgeon: Jan Caban DPM;  Location: [facility];  Service: Podiatry    NE OPEN FIX INTER/SUBTROCH FX,IMPLNT Right 4/29/2016    Procedure: Removal of deep implant; IM inpending right femur fracture;  Surgeon: Kaitlynn Patel MD;  Location:  MAIN OR;  Service: Orthopedics    TONSILLECTOMY      TUBAL LIGATION         Meds/Allergies:    Prior to Admission medications    Medication Sig Start Date End Date Taking?  Authorizing Provider   oxyCODONE-acetaminophen (PERCOCET) 5-325 mg per tablet Take 1 tablet by mouth every 4 (four) hours as needed for moderate pain for up to 10 days Max Daily Amount: 6 tablets 9/14/18 9/24/18  Hernan Chavez DPM     I have reviewed home medications with patient personally  Allergies: Allergies   Allergen Reactions    Lisinopril      Cough    Naproxen GI Intolerance       Social History:     Marital Status:    Occupation:   Patient Pre-hospital Living Situation: was staying with daughter temporarily  Patient Pre-hospital Level of Mobility:  Currently requiring a crutch from her foot surgery  Patient Pre-hospital Diet Restrictions: none  Substance Use History:   History   Alcohol Use No     History   Smoking Status    Current Every Day Smoker    Packs/day: 0 25    Years: 47 00    Types: Cigarettes   Smokeless Tobacco    Never Used     Comment: 1 pack a week/2 cigs a day; previously heavier     History   Drug Use No       Family History:    non-contributory    Physical Exam:     Vitals:   Blood Pressure: 110/59 (09/24/18 1507)  Pulse: 85 (09/24/18 1507)  Temperature: 99 5 °F (37 5 °C) (09/24/18 1507)  Temp Source: Oral (09/24/18 1507)  Respirations: 18 (09/24/18 1507)  Height: 5' 4" (162 6 cm) (09/24/18 1507)  Weight - Scale: 74 kg (163 lb 2 3 oz) (09/24/18 1222)  SpO2: 97 % (09/24/18 1507)    Physical Exam   Constitutional: No distress  Thin female in no apparent distress, does appear to be fatigued   HENT:   Patient's voice is very hoarse  Noted some small white lesions on her tongue possibly from thrush  Slight swelling and healing abrasion on the bridge of her nose   Eyes: Conjunctivae are normal  Pupils are equal, round, and reactive to light  Right eye exhibits no discharge  Left eye exhibits no discharge  No scleral icterus  Neck: Neck supple  Cardiovascular: Normal rate, regular rhythm and normal heart sounds  No murmur heard  Pulmonary/Chest: Effort normal  No stridor  No respiratory distress  She has no wheezes  She has no rales  Patient with decreased breath sounds in all lung fields  No wheezes noted  No dyspnea during conversation  No tachypnea  One episode dry cough noted   Abdominal: Soft   Bowel sounds are normal  She exhibits no distension  There is no tenderness  There is no rebound and no guarding  Skin: Skin is warm and dry  No rash noted  She is not diaphoretic  No erythema  No pallor  Psychiatric: She has a normal mood and affect  Her behavior is normal  Judgment and thought content normal    Vitals reviewed  Additional Data:     Lab Results: I have personally reviewed pertinent reports  Results from last 7 days  Lab Units 09/24/18  1255   WBC Thousand/uL 12 47*   HEMOGLOBIN g/dL 9 5*   HEMATOCRIT % 30 0*   PLATELETS Thousands/uL 281   NEUTROS PCT % 79*   LYMPHS PCT % 9*   MONOS PCT % 8   EOS PCT % 2       Results from last 7 days  Lab Units 09/24/18  1255   SODIUM mmol/L 138   POTASSIUM mmol/L 3 6   CHLORIDE mmol/L 102   CO2 mmol/L 29   BUN mg/dL 17   CREATININE mg/dL 1 06   CALCIUM mg/dL 8 5   ALK PHOS U/L 105   ALT U/L 26   AST U/L 14       Results from last 7 days  Lab Units 09/24/18  1255   INR  1 02               Imaging: I have personally reviewed pertinent reports  XR chest 2 views   ED Interpretation by Shruti Hernandez MD (09/24 1346)   This film was interpreted independently by me  Large left upper lobe infiltrate  Final Result by Eitan Butler MD (09/24 1353)      Interval development of several cm right perihilar pneumonia          Findings are consistent with emergency provider's preliminary reading                     Workstation performed: [id number]             EKG, Pathology, and Other Studies Reviewed on Admission:   ·     Allscripts / Epic Records Reviewed: Yes     ** Please Note: This note has been constructed using a voice recognition system   **

## 2018-09-24 NOTE — ED PROVIDER NOTES
History  Chief Complaint   Patient presents with    Fever - 9 weeks to 74 years     pt s/p left foot surgery 10 days post op   c/o fever and URI x 3 days,       66 y o  Female presents with chief complaint of fever, myalgias, sore throat and cough  Onset was 3 days ago  Patient is currently convalescing with her daughter after recent foot surgery and her daughter was ill with similar symptoms  History provided by:  Patient   used: No    URI   Presenting symptoms: congestion, cough, fever and sore throat    Severity:  Moderate  Onset quality:  Gradual  Duration:  3 days  Timing:  Constant  Progression:  Worsening  Chronicity:  New  Relieved by:  Nothing  Worsened by:  Nothing  Ineffective treatments:  None tried  Associated symptoms: headaches and myalgias    Risk factors: sick contacts        Prior to Admission Medications   Prescriptions Last Dose Informant Patient Reported?  Taking?   oxyCODONE-acetaminophen (PERCOCET) 5-325 mg per tablet   No No   Sig: Take 1 tablet by mouth every 4 (four) hours as needed for moderate pain for up to 10 days Max Daily Amount: 6 tablets      Facility-Administered Medications: None       Past Medical History:   Diagnosis Date    Anxiety     Arthritis     Chronic pain disorder     right hip    Hard of hearing     History of transfusion 05/01/2016    Hypertension     Osteoporosis     Wears dentures        Past Surgical History:   Procedure Laterality Date    CARPAL TUNNEL RELEASE Right     FRACTURE SURGERY      Right hip ORIF    HIP SURGERY      NEUROPLASTY      Decompression Median Nerve At Carpal Tunnel    [place] FUSION BIG TOE,MT-P JT Left 9/14/2018    Procedure: 1ST MTPJ FUSION;  Surgeon: Nelda Chen DPM;  Location: [facility];  Service: Podiatry    [place] OPEN FIX INTER/SUBTROCH FX,IMPLNT Right 4/29/2016    Procedure: Removal of deep implant; IM inpending right femur fracture;  Surgeon: Catalina Russell MD;  Location: [facility]; Service: Orthopedics    TONSILLECTOMY      TUBAL LIGATION         Family History   Problem Relation Age of Onset    Hypertension Mother     Thyroid disease Mother     Osteoporosis Mother     Hypertension Father     Aneurysm Father     Arthritis Family     Hyperthyroidism Family     Osteoporosis Family      I have reviewed and agree with the history as documented  Social History   Substance Use Topics    Smoking status: Current Every Day Smoker     Packs/day: 0 25     Types: Cigarettes    Smokeless tobacco: Never Used      Comment: 1 pack a week/2 cigs a day    Alcohol use No        Review of Systems   Constitutional: Positive for fever  Negative for chills and diaphoresis  HENT: Positive for congestion, sore throat and voice change (hoarse voice)  Respiratory: Positive for cough  Negative for shortness of breath  Cardiovascular: Negative for chest pain and palpitations  Gastrointestinal: Negative for diarrhea, nausea and vomiting  Genitourinary: Negative for dysuria and frequency  Musculoskeletal: Positive for myalgias  Skin: Negative for color change and rash  Neurological: Positive for headaches  All other systems reviewed and are negative  Physical Exam  Physical Exam   Constitutional: She is oriented to person, place, and time  She appears well-developed and well-nourished  She appears distressed  HENT:   Head: Normocephalic and atraumatic  Mouth/Throat: Posterior oropharyngeal erythema present  No oropharyngeal exudate or posterior oropharyngeal edema  Eyes: EOM are normal  Pupils are equal, round, and reactive to light  Neck: Normal range of motion  No JVD present  Cardiovascular: Normal rate, regular rhythm, normal heart sounds and intact distal pulses  Exam reveals no gallop and no friction rub  No murmur heard  Pulmonary/Chest: Effort normal and breath sounds normal  No respiratory distress  She has no wheezes  She has no rales   She exhibits no tenderness  Musculoskeletal: Normal range of motion  She exhibits no tenderness  Neurological: She is alert and oriented to person, place, and time  Skin: Skin is warm and dry  Clammy     Psychiatric: She has a normal mood and affect  Her behavior is normal  Judgment and thought content normal    Nursing note and vitals reviewed  Vital Signs  ED Triage Vitals [09/24/18 1222]   Temperature Pulse Respirations Blood Pressure SpO2   98 2 °F (36 8 °C) 92 18 134/60 94 %      Temp Source Heart Rate Source Patient Position - Orthostatic VS BP Location FiO2 (%)   Oral Monitor Sitting Left arm --      Pain Score       No Pain           Vitals:    09/24/18 1222 09/24/18 1348 09/24/18 1354   BP: 134/60 111/65    Pulse: 92  83   Patient Position - Orthostatic VS: Sitting Sitting        Visual Acuity      ED Medications  Medications   ketorolac (TORADOL) injection 15 mg (0 mg Intravenous Hold 9/24/18 1352)   ceftriaxone (ROCEPHIN) 1 g/50 mL in dextrose IVPB (1,000 mg Intravenous New Bag 9/24/18 1346)   azithromycin (ZITHROMAX) 500 mg in sodium chloride 0 9 % 250 mL IVPB (not administered)   sodium chloride 0 9 % bolus 1,000 mL (1,000 mL Intravenous New Bag 9/24/18 1257)       Diagnostic Studies  Results Reviewed     Procedure Component Value Units Date/Time    Lactic acid, plasma [[id number]]  (Normal) Collected:  09/24/18 1255    Lab Status:  Final result Specimen:  Blood from Arm, Right Updated:  09/24/18 1324     LACTIC ACID 0 5 mmol/L     Narrative:         Result may be elevated if tourniquet was used during collection      Comprehensive metabolic panel [[id number]]  (Abnormal) Collected:  09/24/18 1255    Lab Status:  Final result Specimen:  Blood from Arm, Right Updated:  09/24/18 1321     Sodium 138 mmol/L      Potassium 3 6 mmol/L      Chloride 102 mmol/L      CO2 29 mmol/L      ANION GAP 7 mmol/L      BUN 17 mg/dL      Creatinine 1 06 mg/dL      Glucose 132 mg/dL      Calcium 8 5 mg/dL      AST 14 U/L      ALT 26 U/L      Alkaline Phosphatase 105 U/L      Total Protein 6 7 g/dL      Albumin 3 2 (L) g/dL      Total Bilirubin 0 40 mg/dL      eGFR 57 ml/min/1 73sq m     Narrative:         National Kidney Disease Education Program recommendations are as follows:  GFR calculation is accurate only with a steady state creatinine  Chronic Kidney disease less than 60 ml/min/1 73 sq  meters  Kidney failure less than 15 ml/min/1 73 sq  meters  CBC and differential [[id number]]  (Abnormal) Collected:  09/24/18 1255    Lab Status:  Final result Specimen:  Blood from Arm, Right Updated:  09/24/18 1304     WBC 12 47 (H) Thousand/uL      RBC 3 12 (L) Million/uL      Hemoglobin 9 5 (L) g/dL      Hematocrit 30 0 (L) %      MCV 96 fL      MCH 30 4 pg      MCHC 31 7 g/dL      RDW 13 2 %      MPV 10 1 fL      Platelets 572 Thousands/uL      nRBC 0 /100 WBCs      Neutrophils Relative 79 (H) %      Immat GRANS % 1 %      Lymphocytes Relative 9 (L) %      Monocytes Relative 8 %      Eosinophils Relative 2 %      Basophils Relative 1 %      Neutrophils Absolute 9 99 (H) Thousands/µL      Immature Grans Absolute 0 06 Thousand/uL      Lymphocytes Absolute 1 15 Thousands/µL      Monocytes Absolute 1 00 Thousand/µL      Eosinophils Absolute 0 20 Thousand/µL      Basophils Absolute 0 07 Thousands/µL     Blood culture #2 [[id number]] Collected:  09/24/18 1255    Lab Status: In process Specimen:  Blood from Arm, Left Updated:  09/24/18 1302    Blood culture #1 [[id number]] Collected:  09/24/18 1255    Lab Status: In process Specimen:  Blood from Arm, Right Updated:  09/24/18 [address] [[id number]] Collected:  09/24/18 1255    Lab Status: In process Specimen:  Blood from Arm, Right Updated:  09/24/18 1301    APTT [[id number]] Collected:  09/24/18 1255    Lab Status:   In process Specimen:  Blood from Arm, Right Updated:  09/24/18 1301                 XR chest 2 views   ED Interpretation by Louis Toney MD (09/24 1366)   This film was interpreted independently by me  Large left upper lobe infiltrate  Final Result by Boris Dickey MD (09/24 1353)      Interval development of several cm right perihilar pneumonia          Findings are consistent with emergency provider's preliminary reading                     Workstation performed: [id number]                    Procedures  Procedures       Phone Contacts  ED Phone Contact    ED Course                               MDM  Number of Diagnoses or Management Options  Community acquired pneumonia of right upper lobe of lung [facility]): new and requires workup  Diagnosis management comments: Background: 66 y o  female presents with fever, cough, myalgias    Differential DX includes but is not limited to: pneumonia, bronchitis, viral syndrome    Plan: septic workup, iv abx dose, ivf         Amount and/or Complexity of Data Reviewed  Clinical lab tests: ordered and reviewed  Tests in the radiology section of CPT®: ordered and reviewed  Independent visualization of images, tracings, or specimens: yes    Risk of Complications, Morbidity, and/or Mortality  Presenting problems: high  Diagnostic procedures: high  Management options: high    Patient Progress  Patient progress: stable    CritCare Time    Disposition  Final diagnoses:   Community acquired pneumonia of right upper lobe of lung ([address] )     Time reflects when diagnosis was documented in both MDM as applicable and the Disposition within this note     Time User Action Codes Description Comment    9/24/2018  1:52 PM Jacquie MARSHALL Add [J18 1] Community acquired pneumonia of right upper lobe of lung [facility])       ED Disposition     ED Disposition Condition Comment    Admit  Case was discussed with Dr Hans Coburn and the patient's admission status was agreed to be Admission Status: inpatient status to the service of Dr Hans Coburn           Follow-up Information    None         Patient's Medications   Discharge Prescriptions    No medications on file     No discharge procedures on file      ED Provider  Electronically Signed by           Radha Verma MD  09/24/18 9136

## 2018-09-24 NOTE — ASSESSMENT & PLAN NOTE
· Patient noted to have substantial drop in her hemoglobin from 12 4 three weeks ago to 9 5 today    · No reports of bleeding noted; patient did sustain a mechanical fall on September 15th but does not have any visible evidence of hematoma  · Patient did have foot surgery on September 14th but I spoke directly with her podiatrist and there was no substantial blood loss from the surgery whatsoever  · Will recheck H&H today and anemia panel

## 2018-09-24 NOTE — ASSESSMENT & PLAN NOTE
· Patient presenting with respiratory symptoms of malaise, laryngitis, cough and subjective fever  · Noted to have right perihilar pneumonia on chest x-ray    Has leukocytosis but no other sepsis criteria at this time, except subjective fever prior to arrival  · Check procalcitonin level; check urine Legionella and strep  · Sputum culture if patient is able to expectorate  · Incentive spirometry  · Recommend ongoing treatment for community-acquired pneumonia with ceftriaxone and Zithromax as well as supportive care with IV fluids, oxygen, respiratory protocol, and cough medications/mucolytic regimen

## 2018-09-24 NOTE — ASSESSMENT & PLAN NOTE
· Patient has never had pulmonary function test to determine whether she may have underlying COPD but has longstanding > 40 year history of tobacco use  · Will provide nicotine patch

## 2018-09-25 PROBLEM — D50.9 IRON DEFICIENCY ANEMIA: Status: ACTIVE | Noted: 2018-09-24

## 2018-09-25 LAB
BASOPHILS # BLD AUTO: 0.04 THOUSANDS/ΜL (ref 0–0.1)
BASOPHILS NFR BLD AUTO: 0 % (ref 0–1)
EOSINOPHIL # BLD AUTO: 0.02 THOUSAND/ΜL (ref 0–0.61)
EOSINOPHIL NFR BLD AUTO: 0 % (ref 0–6)
ERYTHROCYTE [DISTWIDTH] IN BLOOD BY AUTOMATED COUNT: 13.2 % (ref 11.6–15.1)
HCT VFR BLD AUTO: 30.8 % (ref 34.8–46.1)
HGB BLD-MCNC: 9.7 G/DL (ref 11.5–15.4)
IMM GRANULOCYTES # BLD AUTO: 0.05 THOUSAND/UL (ref 0–0.2)
IMM GRANULOCYTES NFR BLD AUTO: 0 % (ref 0–2)
L PNEUMO1 AG UR QL IA.RAPID: NEGATIVE
LYMPHOCYTES # BLD AUTO: 1.26 THOUSANDS/ΜL (ref 0.6–4.47)
LYMPHOCYTES NFR BLD AUTO: 11 % (ref 14–44)
MCH RBC QN AUTO: 29.9 PG (ref 26.8–34.3)
MCHC RBC AUTO-ENTMCNC: 31.5 G/DL (ref 31.4–37.4)
MCV RBC AUTO: 95 FL (ref 82–98)
MONOCYTES # BLD AUTO: 0.98 THOUSAND/ΜL (ref 0.17–1.22)
MONOCYTES NFR BLD AUTO: 8 % (ref 4–12)
NEUTROPHILS # BLD AUTO: 9.42 THOUSANDS/ΜL (ref 1.85–7.62)
NEUTS SEG NFR BLD AUTO: 81 % (ref 43–75)
NRBC BLD AUTO-RTO: 0 /100 WBCS
PLATELET # BLD AUTO: 304 THOUSANDS/UL (ref 149–390)
PMV BLD AUTO: 9.6 FL (ref 8.9–12.7)
RBC # BLD AUTO: 3.24 MILLION/UL (ref 3.81–5.12)
S PNEUM AG UR QL: NEGATIVE
WBC # BLD AUTO: 11.77 THOUSAND/UL (ref 4.31–10.16)

## 2018-09-25 PROCEDURE — 85025 COMPLETE CBC W/AUTO DIFF WBC: CPT | Performed by: PHYSICIAN ASSISTANT

## 2018-09-25 PROCEDURE — 94760 N-INVAS EAR/PLS OXIMETRY 1: CPT

## 2018-09-25 PROCEDURE — 99232 SBSQ HOSP IP/OBS MODERATE 35: CPT | Performed by: PHYSICIAN ASSISTANT

## 2018-09-25 PROCEDURE — 94668 MNPJ CHEST WALL SBSQ: CPT

## 2018-09-25 PROCEDURE — 87631 RESP VIRUS 3-5 TARGETS: CPT | Performed by: PHYSICIAN ASSISTANT

## 2018-09-25 RX ORDER — HYDROCODONE POLISTIREX AND CHLORPHENIRAMINE POLISTIREX 10; 8 MG/5ML; MG/5ML
5 SUSPENSION, EXTENDED RELEASE ORAL EVERY 12 HOURS PRN
Status: DISCONTINUED | OUTPATIENT
Start: 2018-09-25 | End: 2018-09-27 | Stop reason: HOSPADM

## 2018-09-25 RX ORDER — GUAIFENESIN 600 MG
1200 TABLET, EXTENDED RELEASE 12 HR ORAL EVERY 12 HOURS SCHEDULED
Status: DISCONTINUED | OUTPATIENT
Start: 2018-09-25 | End: 2018-09-27 | Stop reason: HOSPADM

## 2018-09-25 RX ORDER — ASCORBIC ACID 500 MG
250 TABLET ORAL 2 TIMES DAILY
Status: DISCONTINUED | OUTPATIENT
Start: 2018-09-25 | End: 2018-09-27 | Stop reason: HOSPADM

## 2018-09-25 RX ORDER — DOCUSATE SODIUM 100 MG/1
100 CAPSULE, LIQUID FILLED ORAL 2 TIMES DAILY PRN
Status: DISCONTINUED | OUTPATIENT
Start: 2018-09-25 | End: 2018-09-27 | Stop reason: HOSPADM

## 2018-09-25 RX ORDER — FERROUS SULFATE 325(65) MG
325 TABLET ORAL 2 TIMES DAILY WITH MEALS
Status: DISCONTINUED | OUTPATIENT
Start: 2018-09-25 | End: 2018-09-27 | Stop reason: HOSPADM

## 2018-09-25 RX ADMIN — GUAIFENESIN 600 MG: 600 TABLET, EXTENDED RELEASE ORAL at 08:07

## 2018-09-25 RX ADMIN — BENZONATATE 100 MG: 100 CAPSULE ORAL at 21:20

## 2018-09-25 RX ADMIN — NYSTATIN 500000 UNITS: 100000 SUSPENSION ORAL at 21:20

## 2018-09-25 RX ADMIN — SODIUM CHLORIDE 75 ML/HR: 0.9 INJECTION, SOLUTION INTRAVENOUS at 06:33

## 2018-09-25 RX ADMIN — NICOTINE 1 PATCH: 21 PATCH, EXTENDED RELEASE TRANSDERMAL at 08:09

## 2018-09-25 RX ADMIN — AZITHROMYCIN 250 MG: 250 TABLET, FILM COATED ORAL at 14:09

## 2018-09-25 RX ADMIN — CEFTRIAXONE 1000 MG: 1 INJECTION, POWDER, FOR SOLUTION INTRAMUSCULAR; INTRAVENOUS at 13:10

## 2018-09-25 RX ADMIN — FERROUS SULFATE TAB 325 MG (65 MG ELEMENTAL FE) 325 MG: 325 (65 FE) TAB at 16:59

## 2018-09-25 RX ADMIN — OXYCODONE HYDROCHLORIDE AND ACETAMINOPHEN 250 MG: 500 TABLET ORAL at 13:09

## 2018-09-25 RX ADMIN — NYSTATIN 500000 UNITS: 100000 SUSPENSION ORAL at 17:00

## 2018-09-25 RX ADMIN — ENOXAPARIN SODIUM 40 MG: 40 INJECTION SUBCUTANEOUS at 08:07

## 2018-09-25 RX ADMIN — NYSTATIN 500000 UNITS: 100000 SUSPENSION ORAL at 12:21

## 2018-09-25 RX ADMIN — BENZONATATE 100 MG: 100 CAPSULE ORAL at 08:07

## 2018-09-25 RX ADMIN — ACETAMINOPHEN 650 MG: 325 TABLET, FILM COATED ORAL at 16:59

## 2018-09-25 RX ADMIN — BENZONATATE 100 MG: 100 CAPSULE ORAL at 16:59

## 2018-09-25 RX ADMIN — NYSTATIN 500000 UNITS: 100000 SUSPENSION ORAL at 08:07

## 2018-09-25 RX ADMIN — GUAIFENESIN 1200 MG: 600 TABLET, EXTENDED RELEASE ORAL at 21:20

## 2018-09-25 RX ADMIN — OXYCODONE HYDROCHLORIDE AND ACETAMINOPHEN 250 MG: 500 TABLET ORAL at 17:00

## 2018-09-25 RX ADMIN — FERROUS SULFATE TAB 325 MG (65 MG ELEMENTAL FE) 325 MG: 325 (65 FE) TAB at 13:09

## 2018-09-25 RX ADMIN — SODIUM CHLORIDE 75 ML/HR: 0.9 INJECTION, SOLUTION INTRAVENOUS at 21:07

## 2018-09-25 NOTE — ASSESSMENT & PLAN NOTE
· Patient presenting with respiratory symptoms of malaise, laryngitis, cough and subjective fever  · Noted to have right perihilar pneumonia on chest x-ray    Had leukocytosis but no other sepsis criteria at this time, except subjective fever prior to arrival  · procalcitonin level only very mildly elevated at 0 15  · urine Legionella and strep both negative  · Will also check influenza  · Sputum culture if patient is able to expectorate (thus far could not)  · Incentive spirometry/flutter valve  · Recommend ongoing treatment for community-acquired pneumonia with ceftriaxone and Zithromax, currently day 2,  as well as supportive care with IV fluids given poor oral intake, respiratory protocol, and cough medications/mucolytic regimen  · Monitor O2 needs- was not on any oxygen prior to arrival and no sats <90% have been charted

## 2018-09-25 NOTE — PROGRESS NOTES
Pt was found having some difficulty breathing after a coughing fit  Pt given scheduled medications and PRN in addition to cough drops (see MAR)  Pt increased to 3 LO2 to have oxygen saturations in the mid to high 90's  Will pass to oncoming RN

## 2018-09-25 NOTE — ASSESSMENT & PLAN NOTE
· Patient noted to have substantial drop in her hemoglobin from 12 4 three weeks ago to 9 2    Will recheck CBC stat given extreme fatigue to ensure there has not been another significant drop  · No reports of bleeding noted; patient did sustain a mechanical fall on September 15th but does not have any visible evidence of hematoma  · Should have routine colonoscopy as an outpatient  · Patient did have foot surgery on September 14th but I spoke directly with her podiatrist and there was no substantial blood loss from the surgery whatsoever  · Will recheck cbc in am

## 2018-09-25 NOTE — ASSESSMENT & PLAN NOTE
· Patient has never had pulmonary function test to determine whether she may have underlying COPD but has longstanding > 40 year history of tobacco use  · Recommend follow up after discharge as she likely has elements of hypoxia from undiagnosed COPD  · Continue Xopenex  · Continue nicotine patch

## 2018-09-25 NOTE — PROGRESS NOTES
Progress Note - Jenna Stevens 1955, 66 y o  female MRN: [id number]    Unit/Bed#: -01 Encounter: [id number]    Primary Care Provider: Pao Durán DO   Date and time admitted to hospital: 9/24/2018 12:20 PM    * Community acquired pneumonia of right lung   Assessment & Plan    · Patient presenting with respiratory symptoms of malaise, laryngitis, cough and subjective fever  · Noted to have right perihilar pneumonia on chest x-ray  Had leukocytosis but no other sepsis criteria at this time, except subjective fever prior to arrival  · procalcitonin level only very mildly elevated at 0 15  · urine Legionella and strep both negative  · Will also check influenza  · Sputum culture if patient is able to expectorate (thus far could not)  · Incentive spirometry/flutter valve  · Recommend ongoing treatment for community-acquired pneumonia with ceftriaxone and Zithromax, currently day 2,  as well as supportive care with IV fluids given poor oral intake, respiratory protocol, and cough medications/mucolytic regimen  · Monitor O2 needs- was not on any oxygen prior to arrival and no sats <90% have been charted        Iron deficiency anemia   Assessment & Plan    · Patient noted to have substantial drop in her hemoglobin from 12 4 three weeks ago to 9 2    Will recheck CBC stat given extreme fatigue to ensure there has not been another significant drop  · No reports of bleeding noted; patient did sustain a mechanical fall on September 15th but does not have any visible evidence of hematoma  · Should have routine colonoscopy as an outpatient  · Patient did have foot surgery on September 14th but I spoke directly with her podiatrist and there was no substantial blood loss from the surgery whatsoever  · Will recheck cbc in am        Tobacco abuse   Assessment & Plan    · Patient has never had pulmonary function test to determine whether she may have underlying COPD but has longstanding > 40 year history of tobacco use  · Recommend follow up after discharge as she likely has elements of hypoxia from undiagnosed COPD  · Continue Xopenex  · Continue nicotine patch          VTE Pharmacologic Prophylaxis:   Pharmacologic: Enoxaparin (Lovenox)  Mechanical VTE Prophylaxis in Place: Yes    Patient Centered Rounds: I have performed bedside rounds with nursing staff today  Discussions with Specialists or Other Care Team Provider: case mgmt    Education and Discussions with Family / Patient: called daughter, left message    Time Spent for Care: 25 minutes  More than 50% of total time spent on counseling and coordination of care as described above  Current Length of Stay: 1 day(s)    Current Patient Status: Inpatient   Certification Statement: The patient will continue to require additional inpatient hospital stay due to ongoing symptoms from pneumonia    Discharge Plan:  Home when medically stable, hopefully tomorrow if improved    Code Status: Level 1 - Full Code      Subjective:   Patient reports that she feels so tired and just wants to sleep  Nursing also reports that she has been sleeping all morning  She is not currently reporting any shortness of breath  She does admit that she has a lot of chest discomfort when she coughs and therefore has been avoiding coughing  She says that overall she feels a little bit better but not by much  She also has a full breakfast tray in front of her and says she has no appetite and has not eaten breakfast or lunch today  Not foot pain and no bleeding noted  Objective:     Vitals:   Temp (24hrs), Av 6 °F (37 6 °C), Min:99 5 °F (37 5 °C), Max:99 7 °F (37 6 °C)    HR:  [83-99] 99  Resp:  [18] 18  BP: (110-171)/(59-90) 158/80  SpO2:  [90 %-97 %] 96 %  Body mass index is 28 kg/m²  Input and Output Summary (last 24 hours):        Intake/Output Summary (Last 24 hours) at 18 1313  Last data filed at 18 0819   Gross per 24 hour   Intake             1050 ml   Output 650 ml   Net              400 ml       Physical Exam:     Physical Exam   Constitutional:   Thin female who appears older than stated age, almost seems to be shivering at this time but tells me she feels warm (no fever noted though)  Appears actually somewhat worse than yesterday, very fatigued/malaised   HENT:   Head: Normocephalic and atraumatic  Improvement in laryngitis noted, voice is improved   Eyes: Conjunctivae are normal  Right eye exhibits no discharge  Left eye exhibits no discharge  No scleral icterus  Neck: Neck supple  Cardiovascular: Normal rate and regular rhythm  No murmur heard  Pulmonary/Chest: No stridor  No respiratory distress  She has no wheezes  Abdominal: Soft  She exhibits no distension  There is no tenderness  There is no guarding  Musculoskeletal: She exhibits no edema  Neurological: She is alert  She was sleeping when I entered but was easily woken and appropriately interactive and answers questions appropriately   Skin: Skin is warm and dry  No rash noted  No erythema  No pallor  Psychiatric: She has a normal mood and affect  Her behavior is normal    Vitals reviewed  Additional Data:     Labs:      Results from last 7 days  Lab Units 09/24/18  1645 09/24/18  1255   WBC Thousand/uL  --  12 47*   HEMOGLOBIN g/dL 9 2* 9 5*   HEMATOCRIT % 29 2* 30 0*   PLATELETS Thousands/uL 266 281   NEUTROS PCT %  --  79*   LYMPHS PCT %  --  9*   MONOS PCT %  --  8   EOS PCT %  --  2       Results from last 7 days  Lab Units 09/24/18  1255   SODIUM mmol/L 138   POTASSIUM mmol/L 3 6   CHLORIDE mmol/L 102   CO2 mmol/L 29   BUN mg/dL 17   CREATININE mg/dL 1 06   CALCIUM mg/dL 8 5   ALK PHOS U/L 105   ALT U/L 26   AST U/L 14       Results from last 7 days  Lab Units 09/24/18  1255   INR  1 02               * I Have Reviewed All Lab Data Listed Above  * Additional Pertinent Lab Tests Reviewed:  [address] Admission Reviewed    Imaging:    Imaging Reports Reviewed Today Include:   Imaging Personally Reviewed by Myself Includes:      Recent Cultures (last 7 days):       Results from last 7 days  Lab Units 09/24/18 2107   LEGIONELLA URINARY ANTIGEN  Negative       Last 24 Hours Medication List:     Current Facility-Administered Medications:  acetaminophen 650 mg Oral Q6H PRN Kristal Moon PA-C    albuterol 2 puff Inhalation Q4H PRN Kristal Moon PA-C    ascorbic acid 250 mg Oral BID Kristal Moon PA-C    cefTRIAXone 1,000 mg Intravenous Q24H Kristal Moon PA-C Last Rate: 1,000 mg (09/25/18 1310)   And        azithromycin 250 mg Oral Q24H Kristal Moon PA-C    benzonatate 100 mg Oral TID Tanvir Campbell PA-C    docusate sodium 100 mg Oral BID PRN Kristal Moon PA-C    enoxaparin 40 mg Subcutaneous Daily Kristal Moon PA-C    ferrous sulfate 325 mg Oral BID With Meals Kristal Moon PA-C    guaiFENesin 1,200 mg Oral Q12H [facility] Kristal Moon PA-C    hydrocodone-chlorpheniramine polistirex 5 mL Oral Q12H PRN Kristal Moon PA-C    levalbuterol 1 25 mg Nebulization Q8H PRN Abiel Viveros MD    nicotine 1 patch Transdermal Daily Kristal Moon PA-C    nystatin 500,000 Units Swish & Swallow 4x Daily Kristal Moon PA-C    ondansetron 4 mg Intravenous Q4H PRN Kristal Moon PA-C    oxyCODONE-acetaminophen 1 tablet Oral Q4H PRN Kristal Moon, MAUREEN    phenol 1 spray Mouth/Throat Q2H PRN Kristal Moon PA-C    sodium chloride 75 mL/hr Intravenous Continuous Kristal Moon PA-C Last Rate: 75 mL/hr (09/25/18 9214)   sodium chloride 3 mL Nebulization Q8H PRN Abiel Viveros MD         Today, Patient Was Seen By: aSnthosh Elder PA-C    ** Please Note: Dictation voice to text software may have been used in the creation of this document   **

## 2018-09-26 ENCOUNTER — APPOINTMENT (INPATIENT)
Dept: CT IMAGING | Facility: HOSPITAL | Age: 63
DRG: 193 | End: 2018-09-26
Payer: COMMERCIAL

## 2018-09-26 ENCOUNTER — APPOINTMENT (INPATIENT)
Dept: RADIOLOGY | Facility: HOSPITAL | Age: 63
DRG: 193 | End: 2018-09-26
Payer: COMMERCIAL

## 2018-09-26 PROBLEM — R09.02 HYPOXIA: Status: ACTIVE | Noted: 2018-09-26

## 2018-09-26 LAB
BASOPHILS # BLD AUTO: 0.06 THOUSANDS/ΜL (ref 0–0.1)
BASOPHILS NFR BLD AUTO: 1 % (ref 0–1)
EOSINOPHIL # BLD AUTO: 0.06 THOUSAND/ΜL (ref 0–0.61)
EOSINOPHIL NFR BLD AUTO: 1 % (ref 0–6)
ERYTHROCYTE [DISTWIDTH] IN BLOOD BY AUTOMATED COUNT: 13.2 % (ref 11.6–15.1)
FLUAV AG SPEC QL: NORMAL
FLUBV AG SPEC QL: NORMAL
HCT VFR BLD AUTO: 34.5 % (ref 34.8–46.1)
HGB BLD-MCNC: 10.9 G/DL (ref 11.5–15.4)
IMM GRANULOCYTES # BLD AUTO: 0.05 THOUSAND/UL (ref 0–0.2)
IMM GRANULOCYTES NFR BLD AUTO: 0 % (ref 0–2)
LYMPHOCYTES # BLD AUTO: 1.74 THOUSANDS/ΜL (ref 0.6–4.47)
LYMPHOCYTES NFR BLD AUTO: 14 % (ref 14–44)
MCH RBC QN AUTO: 29.6 PG (ref 26.8–34.3)
MCHC RBC AUTO-ENTMCNC: 31.6 G/DL (ref 31.4–37.4)
MCV RBC AUTO: 94 FL (ref 82–98)
MONOCYTES # BLD AUTO: 0.96 THOUSAND/ΜL (ref 0.17–1.22)
MONOCYTES NFR BLD AUTO: 8 % (ref 4–12)
NEUTROPHILS # BLD AUTO: 9.36 THOUSANDS/ΜL (ref 1.85–7.62)
NEUTS SEG NFR BLD AUTO: 76 % (ref 43–75)
NRBC BLD AUTO-RTO: 0 /100 WBCS
PLATELET # BLD AUTO: 335 THOUSANDS/UL (ref 149–390)
PMV BLD AUTO: 9.3 FL (ref 8.9–12.7)
PROCALCITONIN SERPL-MCNC: 0.08 NG/ML
RBC # BLD AUTO: 3.68 MILLION/UL (ref 3.81–5.12)
RSV B RNA SPEC QL NAA+PROBE: NORMAL
WBC # BLD AUTO: 12.23 THOUSAND/UL (ref 4.31–10.16)

## 2018-09-26 PROCEDURE — 85025 COMPLETE CBC W/AUTO DIFF WBC: CPT | Performed by: PHYSICIAN ASSISTANT

## 2018-09-26 PROCEDURE — 84145 PROCALCITONIN (PCT): CPT | Performed by: PHYSICIAN ASSISTANT

## 2018-09-26 PROCEDURE — 99232 SBSQ HOSP IP/OBS MODERATE 35: CPT | Performed by: PHYSICIAN ASSISTANT

## 2018-09-26 PROCEDURE — 99253 IP/OBS CNSLTJ NEW/EST LOW 45: CPT | Performed by: INTERNAL MEDICINE

## 2018-09-26 PROCEDURE — 71045 X-RAY EXAM CHEST 1 VIEW: CPT

## 2018-09-26 PROCEDURE — 71250 CT THORAX DX C-: CPT

## 2018-09-26 RX ADMIN — FERROUS SULFATE TAB 325 MG (65 MG ELEMENTAL FE) 325 MG: 325 (65 FE) TAB at 08:08

## 2018-09-26 RX ADMIN — BENZONATATE 100 MG: 100 CAPSULE ORAL at 17:01

## 2018-09-26 RX ADMIN — SODIUM CHLORIDE 75 ML/HR: 0.9 INJECTION, SOLUTION INTRAVENOUS at 08:16

## 2018-09-26 RX ADMIN — CEFTRIAXONE 1000 MG: 1 INJECTION, POWDER, FOR SOLUTION INTRAMUSCULAR; INTRAVENOUS at 13:52

## 2018-09-26 RX ADMIN — NICOTINE 1 PATCH: 21 PATCH, EXTENDED RELEASE TRANSDERMAL at 08:09

## 2018-09-26 RX ADMIN — BENZONATATE 100 MG: 100 CAPSULE ORAL at 08:08

## 2018-09-26 RX ADMIN — GUAIFENESIN 1200 MG: 600 TABLET, EXTENDED RELEASE ORAL at 20:58

## 2018-09-26 RX ADMIN — GUAIFENESIN 1200 MG: 600 TABLET, EXTENDED RELEASE ORAL at 08:08

## 2018-09-26 RX ADMIN — OXYCODONE HYDROCHLORIDE AND ACETAMINOPHEN 250 MG: 500 TABLET ORAL at 08:08

## 2018-09-26 RX ADMIN — FERROUS SULFATE TAB 325 MG (65 MG ELEMENTAL FE) 325 MG: 325 (65 FE) TAB at 17:01

## 2018-09-26 RX ADMIN — NYSTATIN 500000 UNITS: 100000 SUSPENSION ORAL at 17:01

## 2018-09-26 RX ADMIN — OXYCODONE HYDROCHLORIDE AND ACETAMINOPHEN 250 MG: 500 TABLET ORAL at 17:01

## 2018-09-26 RX ADMIN — NYSTATIN 500000 UNITS: 100000 SUSPENSION ORAL at 08:08

## 2018-09-26 RX ADMIN — NYSTATIN 500000 UNITS: 100000 SUSPENSION ORAL at 20:58

## 2018-09-26 RX ADMIN — AZITHROMYCIN 250 MG: 250 TABLET, FILM COATED ORAL at 13:52

## 2018-09-26 RX ADMIN — NYSTATIN 500000 UNITS: 100000 SUSPENSION ORAL at 12:01

## 2018-09-26 RX ADMIN — BENZONATATE 100 MG: 100 CAPSULE ORAL at 20:58

## 2018-09-26 RX ADMIN — SODIUM CHLORIDE 75 ML/HR: 0.9 INJECTION, SOLUTION INTRAVENOUS at 20:56

## 2018-09-26 RX ADMIN — ENOXAPARIN SODIUM 40 MG: 40 INJECTION SUBCUTANEOUS at 08:08

## 2018-09-26 NOTE — CASE MANAGEMENT
Continued Stay Review    Date: 9/26/2018    Vital Signs: /76   Pulse (!) 106   Temp 99 1 °F (37 3 °C) (Oral)   Resp 20   Ht 5' 4" (1 626 m)   Wt 74 kg (163 lb 2 3 oz)   SpO2 91%   BMI 28 00 kg/m²   09/26/18 1108  --  --  --  --  --  91 %  Nasal cannula  --   09/26/18 1107  --  --  --  --  --   86 %  None          Medications:   Scheduled Meds:   Current Facility-Administered Medications:  acetaminophen 650 mg Oral Q6H PRN    albuterol 2 puff Inhalation Q4H PRN    ascorbic acid 250 mg Oral BID    cefTRIAXone 1,000 mg Intravenous Q24H Last Rate: 1,000 mg (09/25/18 1310)   And       azithromycin 250 mg Oral Q24H    benzonatate 100 mg Oral TID    docusate sodium 100 mg Oral BID PRN    enoxaparin 40 mg Subcutaneous Daily    ferrous sulfate 325 mg Oral BID With Meals    guaiFENesin 1,200 mg Oral Q12H [facility]    hydrocodone-chlorpheniramine polistirex 5 mL Oral Q12H PRN    levalbuterol 1 25 mg Nebulization Q8H PRN    nicotine 1 patch Transdermal Daily    nystatin 500,000 Units Swish & Swallow 4x Daily    ondansetron 4 mg Intravenous Q4H PRN    oxyCODONE-acetaminophen 1 tablet Oral Q4H PRN    phenol 1 spray Mouth/Throat Q2H PRN    sodium chloride 75 mL/hr Intravenous Continuous Last Rate: 75 mL/hr (09/26/18 0816)   sodium chloride 3 mL Nebulization Q8H PRN      Continuous Infusions:   sodium chloride 75 mL/hr Last Rate: 75 mL/hr (09/26/18 0816)     PRN Meds: not used  Abnormal Labs/Diagnostic Results:   Wbc 12 23, hgb 10 9, hct 34 5  CxR- Patchy diffuse process involving the right lung which is slightly worse    Age/Sex: 66 y o  female     Assessment/Plan: this is a 66year old female admitted 9/24/2018 with right perihilar pneumonia  She continues with symptoms of poor appetite, laryngitis and dry cough and fever maximum of 99 9  Today she desaturated on room air to 86 %  And is requiring oxygen 2 liters  Her leukocytosis persists at 12 23  Her CxR is slightly worse   Patient requires continued treatment with IV antibiotics  oxygen    Discharge Plan: to be determined

## 2018-09-26 NOTE — PROGRESS NOTES
Progress Note - Gabrielae Solid 1955, 66 y o  female MRN: [id number]    Unit/Bed#: -01 Encounter: [id number]    Primary Care Provider: Keanu Monae DO   Date and time admitted to hospital: 9/24/2018 12:20 PM    * Community acquired pneumonia of right lung   Assessment & Plan    · Patient presenting with respiratory symptoms of malaise, poor appetite, laryngitis, dry cough and subjective fever  Still has only reported minimal improvement  · Noted to have right perihilar pneumonia on chest x-ray  Has persistent leukocytosis but no other sepsis criteria at this time, except subjective fever prior to arrival  · procalcitonin level only very mildly elevated at 0 15, repeat ordered  · urine Legionella and strep both negative  · await influenza/rsv PCR--if positive this could explain why she has had sluggish improvement  · Sputum culture if patient is able to expectorate (thus far could not)  · Incentive spirometry/flutter valve  · Recommend ongoing treatment for community-acquired pneumonia with ceftriaxone and Zithromax, currently day 3,  as well as supportive care with IV fluids given poor oral intake, respiratory protocol, and cough medications/mucolytic regimen  · Monitor O2 needs- was not on any oxygen prior to arrival, O2 sat on room air at rest noted to be 86% today  · Will check repeat chest x-ray  · Will ask for pulmonology evaluation given lack of improvement and anticipated need for follow-up        Hypoxia   Assessment & Plan    · Patient has been on anywhere from 2-3 L of oxygen since admission  Currently O2 sat noted to be 86% on room air at rest  · Highly suspect she has underlying COPD but has never had formal testing  · Will ask for pulmonology evaluation    Patient may require oxygen at discharge, but hopefully will do more aggressive incentive spirometry and flutter valve today and have further improvement before discharge  · Not noted to be wheezing therefore did not feel she required steroids        Iron deficiency anemia   Assessment & Plan    · Patient noted to have substantial drop in her hemoglobin from 12 4 three weeks ago to 9 2  Monitored trend, no further drop noted  · Initiated iron + vitamin C  · No reports of bleeding noted; patient did sustain a mechanical fall on September 15th but does not have any visible evidence of hematoma  · Should have routine colonoscopy as an outpatient  · Patient did have foot surgery on September 14th but I spoke directly with her podiatrist and there was no substantial blood loss from the surgery whatsoever          Tobacco abuse   Assessment & Plan    · Patient has never had pulmonary function test to determine whether she may have underlying COPD but has longstanding > 40 year history of tobacco use  · Reports today that she is ready to quit  · Recommend follow up after discharge as she likely has elements of hypoxia from undiagnosed COPD  · Continue Xopenex/respiratory protocol  · Continue nicotine patch          VTE Pharmacologic Prophylaxis:   Pharmacologic: Enoxaparin (Lovenox)  Mechanical VTE Prophylaxis in Place: Yes    Patient Centered Rounds: I have performed bedside rounds with nursing staff today  Discussions with Specialists or Other Care Team Provider: case mgmt    Education and Discussions with Family / Patient:     Time Spent for Care: 30 minutes  More than 50% of total time spent on counseling and coordination of care as described above  Current Length of Stay: 2 day(s)    Current Patient Status: Inpatient   Certification Statement: The patient will continue to require additional inpatient hospital stay due to ongoing hypoxia and failure to improve    Discharge Plan:   Hopefully discharge to home tomorrow however will depend upon patient's improvement and evaluation from pulmonology    Code Status: Level 1 - Full Code    Subjective:   Patient continues to have extremely poor appetite and is barely eating anything    She is not reporting any worsening shortness of breath but continues to have dry cough and is unable to expectorate  She has increased pain with taking deep breath or with coughing  She is not currently noting any wheezing  She continues to feel feverish at times  Objective:     Vitals:   Temp (24hrs), Av 3 °F (37 4 °C), Min:98 9 °F (37 2 °C), Max:99 9 °F (37 7 °C)    HR:  [] 106  Resp:  [18-30] 20  BP: (136-146)/(76-93) 146/76  SpO2:  [86 %-96 %] 91 %  Body mass index is 28 kg/m²  Input and Output Summary (last 24 hours): Intake/Output Summary (Last 24 hours) at 18 1133  Last data filed at 18 1915   Gross per 24 hour   Intake              300 ml   Output              200 ml   Net              100 ml       Physical Exam:     Physical Exam   Constitutional: No distress  Thin female who appears older than stated age  Lying in bed, awake and interactive but still with malaise   HENT:   Head: Normocephalic and atraumatic  Eyes: Conjunctivae are normal  Right eye exhibits no discharge  Left eye exhibits no discharge  No scleral icterus  Neck: Neck supple  Cardiovascular: Normal rate, regular rhythm and normal heart sounds  No murmur heard  Pulmonary/Chest: Effort normal  No stridor  No respiratory distress  She has no wheezes  She has no rales  She does not appear dyspneic during conversation  She is not tachypneic  She is not noted to be coughing  She has reduced breath sounds in all lung fields  No wheezing appreciated  Abdominal: Soft  She exhibits no distension  There is no tenderness  Musculoskeletal: She exhibits deformity (left foot in surgical shoe (s/p recent toe surgery))  She exhibits no edema or tenderness  Neurological: She is alert  Awake alert and interactive she is a good historian   Skin: Skin is warm and dry  No rash noted  She is not diaphoretic  No erythema  No pallor  Psychiatric: She has a normal mood and affect   Her behavior is normal  Thought content normal    Vitals reviewed  Additional Data:     Labs:      Results from last 7 days  Lab Units 09/26/18  0643   WBC Thousand/uL 12 23*   HEMOGLOBIN g/dL 10 9*   HEMATOCRIT % 34 5*   PLATELETS Thousands/uL 335   NEUTROS PCT % 76*   LYMPHS PCT % 14   MONOS PCT % 8   EOS PCT % 1       Results from last 7 days  Lab Units 09/24/18  1255   SODIUM mmol/L 138   POTASSIUM mmol/L 3 6   CHLORIDE mmol/L 102   CO2 mmol/L 29   BUN mg/dL 17   CREATININE mg/dL 1 06   CALCIUM mg/dL 8 5   ALK PHOS U/L 105   ALT U/L 26   AST U/L 14       Results from last 7 days  Lab Units 09/24/18  1255   INR  1 02               * I Have Reviewed All Lab Data Listed Above  * Additional Pertinent Lab Tests Reviewed: Dejon 68 Admission Reviewed    Imaging:    Imaging Reports Reviewed Today Include:   Imaging Personally Reviewed by Myself Includes:      Recent Cultures (last 7 days):       Results from last 7 days  Lab Units 09/24/18  2107 09/24/18  1255   BLOOD CULTURE   --  No Growth at 24 hrs  No Growth at 24 hrs     LEGIONELLA URINARY ANTIGEN  Negative  --        Last 24 Hours Medication List:     Current Facility-Administered Medications:  acetaminophen 650 mg Oral Q6H PRN Kristal Moon PA-C    albuterol 2 puff Inhalation Q4H PRN Kristal Moon PA-C    ascorbic acid 250 mg Oral BID Kristal Moon PA-C    cefTRIAXone 1,000 mg Intravenous Q24H Kristal Moon PA-C Last Rate: 1,000 mg (09/25/18 1310)   And        azithromycin 250 mg Oral Q24H Kristal Moon PA-C    benzonatate 100 mg Oral TID Tanvir Campbell PA-C    docusate sodium 100 mg Oral BID PRN Kristal Moon PA-C    enoxaparin 40 mg Subcutaneous Daily Kristal Moon PA-C    ferrous sulfate 325 mg Oral BID With Meals Kristal Moon PA-C    guaiFENesin 1,200 mg Oral Q12H [facility] Kristal Moon PA-C    hydrocodone-chlorpheniramine polistirex 5 mL Oral Q12H PRN Kristal Moon PA-C    levalbuterol 1 25 mg Nebulization Q8H PRN Abiel Viveros MD    nicotine 1 patch Transdermal Daily Kristal Moon PA-C    nystatin 500,000 Units Swish & Swallow 4x Daily Kristal Moon PA-C    ondansetron 4 mg Intravenous Q4H PRN Kristal Moon PA-C    oxyCODONE-acetaminophen 1 tablet Oral Q4H PRN Kristal Moon PA-C    phenol 1 spray Mouth/Throat Q2H PRN Kristal Moon PA-C    sodium chloride 75 mL/hr Intravenous Continuous Kristal Moon PA-C Last Rate: 75 mL/hr (09/26/18 0816)   sodium chloride 3 mL Nebulization Q8H PRN Abiel Viveros MD         Today, Patient Was Seen By: Jamshid Coyne PA-C    ** Please Note: Dictation voice to text software may have been used in the creation of this document   **

## 2018-09-26 NOTE — CONSULTS
Consultation - Pulmonary Medicine   Nanda Hinson 66 y o  female MRN: [id number]  Unit/Bed#: -01 Encounter: [id number]      Assessment:  1  Right upper lobe community-acquired pneumonia  2  Acute hypoxic respiratory failure  3  Probable underlying COPD of unclear severity without exacerbation  4  Tobacco abuse    Plan:   1   CT of chest without contrast ordered  2   Continue Zithromax and Rocephin  Day 3  First dose 09/24/2018  3  Titrate oxygen to maintain O2 saturation greater than 88%  4   Sputum culture pending  Repeat procalcitonin pending  5   Pulmonary toilet/airway clearance protocol  Incentive spirometry  6  Tobacco cessation/nicotine replacement therapy  7  Continue albuterol/Xopenex p r n  History of Present Illness   Physician Requesting Consult: Wilfredo Salazar MD  Reason for Consult / Principal Problem:  Community-acquired pneumonia, hypoxia, and tobacco abuse  Hx and PE limited by:  Nothing  HPI: Nanda Hinson is a 66y o  year old female who presents with general malaise, fatigue, dry cough, subjective fever of 100° at home, flu-like symptoms and chest congestion/dyspnea  Patient had foot surgery approximately 12 days ago went to stay with her daughter who had some type of presumed viral respiratory illness  Over the past week patient developed above symptoms and stated that the flu-like symptoms/severe myalgia made her come to hospital for further evaluation  Patient denies any history of lung disease however has been smoking for past 47 years  Approximately 40 pack year history  Still smokes approximately 1 pack per week  Patient's Legionella/strep pneumo and influenza are all negative  Currently with leukocytosis and anemia  Procalcitonin was negative and a repeat is pending at this time  09/26/2018 portable chest x-ray reviewed showing patchy diffuse infiltrates involving right upper lung compared with 09/12/2018 which was normal chest x-ray    Patient on day 3 of Rocephin and Zithromax  Patient with hypoxia with room air O2 sat today of 86% and placed on nasal cannula  Inpatient consult to Pulmonology  Consult performed by: Jamaal Parnell ordered by: Margo Henao          Review of Systems   Constitutional: Positive for chills and fatigue  HENT: Positive for voice change ( hoarse voice)  Negative for sinus pain, sinus pressure and sore throat  Respiratory: Positive for cough and shortness of breath  Cardiovascular: Negative for chest pain, palpitations and leg swelling  Genitourinary: Negative for decreased urine volume  Musculoskeletal: Positive for myalgias  Skin: Negative for rash         Historical Information   Past Medical History:   Diagnosis Date    Anxiety     Arthritis     Chronic pain disorder     right hip    Hard of hearing     History of transfusion 05/01/2016    Hypertension     Osteoporosis     Wears dentures      Past Surgical History:   Procedure Laterality Date    CARPAL TUNNEL RELEASE Right     FRACTURE SURGERY      Right hip ORIF    HIP SURGERY      NEUROPLASTY      Decompression Median Nerve At Carpal Tunnel    TX FUSION BIG TOE,MT-P JT Left 9/14/2018    Procedure: 1ST MTPJ FUSION;  Surgeon: Ely Majano DPM;  Location: [facility];  Service: Podiatry    TX OPEN FIX INTER/SUBTROCH FX,IMPLNT Right 4/29/2016    Procedure: Removal of deep implant; IM inpending right femur fracture;  Surgeon: Yaz Earl MD;  Location: [facility];  Service: Orthopedics    TONSILLECTOMY      TUBAL LIGATION       Social History   History   Alcohol Use No     History   Drug Use No     History   Smoking Status    Current Every Day Smoker    Packs/day: 0 25    Years: 47 00    Types: Cigarettes   Smokeless Tobacco    Never Used     Comment: 1 pack a week/2 cigs a day; previously heavier     Occupational History:  Noncontributory    Pet-1 dog  No birds in household  No recent travel    Family History: non-contributory    Meds/Allergies   all current active meds have been reviewed and pertinent pulmonary meds have been reviewed    Allergies   Allergen Reactions    Lisinopril      Cough    Naproxen GI Intolerance       Objective   Vitals: Blood pressure 146/76, pulse (!) 106, temperature 99 1 °F (37 3 °C), temperature source Oral, resp  rate 20, height 5' 4" (1 626 m), weight 74 kg (163 lb 2 3 oz), SpO2 91 %  ,Body mass index is 28 kg/m²  Intake/Output Summary (Last 24 hours) at 09/26/18 1401  Last data filed at 09/26/18 0800   Gross per 24 hour   Intake              400 ml   Output              200 ml   Net              200 ml     Invasive Devices     Peripheral Intravenous Line            Peripheral IV 09/24/18 Right Antecubital 2 days                Physical Exam   Constitutional: She is oriented to person, place, and time  She appears well-developed and well-nourished  HENT:   Head: Normocephalic  Eyes: Pupils are equal, round, and reactive to light  Neck: Neck supple  No tracheal deviation present  Cardiovascular: Regular rhythm, normal heart sounds and intact distal pulses  Exam reveals no gallop and no friction rub  No murmur heard  Tachycardia   Pulmonary/Chest: Effort normal  No respiratory distress  She has no wheezes  She has no rales  She exhibits no tenderness  Bronchial breath sounds on right   Abdominal: Soft  Bowel sounds are normal  She exhibits no distension and no mass  There is no tenderness  There is no rebound and no guarding  Musculoskeletal: She exhibits no edema or tenderness  Lymphadenopathy:     She has no cervical adenopathy  Neurological: She is alert and oriented to person, place, and time  She exhibits normal muscle tone  Coordination normal    Skin: No rash noted  Psychiatric: She has a normal mood and affect  Nursing note and vitals reviewed  Lab Results:   I have personally reviewed pertinent lab results  , ABG: No results found for: PHART, NKN0GRR, PO2ART, ZHZ9WRJ, B6IUMIXC, BEART, SOURCE, BNP: No results found for: BNP, CBC:   Lab Results   Component Value Date    WBC 12 23 (H) 09/26/2018    HGB 10 9 (L) 09/26/2018    HCT 34 5 (L) 09/26/2018    MCV 94 09/26/2018     09/26/2018    MCH 29 6 09/26/2018    MCHC 31 6 09/26/2018    RDW 13 2 09/26/2018    MPV 9 3 09/26/2018    NRBC 0 09/26/2018   , CMP: No results found for: NA, K, CL, CO2, ANIONGAP, BUN, CREATININE, GLUCOSE, CALCIUM, AST, ALT, ALKPHOS, PROT, ALBUMIN, BILITOT, EGFR, PT/INR: No results found for: PT, INR, Troponin: No results found for: TROPONINI  Imaging Studies: I have personally reviewed pertinent reports  and I have personally reviewed pertinent films in PACS  EKG, Pathology, and Other Studies: I have personally reviewed pertinent reports     and I have personally reviewed pertinent films in PACS  VTE Prophylaxis: Enoxaparin (Lovenox)    Code Status: Level 1 - Full Code

## 2018-09-26 NOTE — ASSESSMENT & PLAN NOTE
· Patient has been on anywhere from 2-3 L of oxygen since admission  Currently O2 sat noted to be 86% on room air at rest  · Highly suspect she has underlying COPD but has never had formal testing  · Will ask for pulmonology evaluation    Patient may require oxygen at discharge, but hopefully will do more aggressive incentive spirometry and flutter valve today and have further improvement before discharge  · Not noted to be wheezing therefore did not feel she required steroids

## 2018-09-26 NOTE — ASSESSMENT & PLAN NOTE
· Patient noted to have substantial drop in her hemoglobin from 12 4 three weeks ago to 9 2   Monitored trend, no further drop noted  · Initiated iron + vitamin C  · No reports of bleeding noted; patient did sustain a mechanical fall on September 15th but does not have any visible evidence of hematoma  · Should have routine colonoscopy as an outpatient  · Patient did have foot surgery on September 14th but I spoke directly with her podiatrist and there was no substantial blood loss from the surgery whatsoever

## 2018-09-26 NOTE — ASSESSMENT & PLAN NOTE
· Patient has never had pulmonary function test to determine whether she may have underlying COPD but has longstanding > 40 year history of tobacco use  · Reports today that she is ready to quit  · Recommend follow up after discharge as she likely has elements of hypoxia from undiagnosed COPD  · Continue Xopenex/respiratory protocol  · Continue nicotine patch

## 2018-09-26 NOTE — ASSESSMENT & PLAN NOTE
· Patient presenting with respiratory symptoms of malaise, poor appetite, laryngitis, dry cough and subjective fever  Still has only reported minimal improvement  · Noted to have right perihilar pneumonia on chest x-ray  Has persistent leukocytosis but no other sepsis criteria at this time, except subjective fever prior to arrival  · procalcitonin level only very mildly elevated at 0 15, repeat ordered  · urine Legionella and strep both negative  · await influenza/rsv PCR--if positive this could explain why she has had sluggish improvement  · Sputum culture if patient is able to expectorate (thus far could not)  · Incentive spirometry/flutter valve  · Recommend ongoing treatment for community-acquired pneumonia with ceftriaxone and Zithromax, currently day 3,  as well as supportive care with IV fluids given poor oral intake, respiratory protocol, and cough medications/mucolytic regimen    · Monitor O2 needs- was not on any oxygen prior to arrival, O2 sat on room air at rest noted to be 86% today  · Will check repeat chest x-ray  · Will ask for pulmonology evaluation given lack of improvement and anticipated need for follow-up

## 2018-09-27 VITALS
SYSTOLIC BLOOD PRESSURE: 132 MMHG | RESPIRATION RATE: 16 BRPM | HEART RATE: 91 BPM | OXYGEN SATURATION: 95 % | TEMPERATURE: 98 F | DIASTOLIC BLOOD PRESSURE: 69 MMHG | WEIGHT: 163.14 LBS | BODY MASS INDEX: 27.85 KG/M2 | HEIGHT: 64 IN

## 2018-09-27 PROBLEM — R09.02 HYPOXIA: Status: RESOLVED | Noted: 2018-09-26 | Resolved: 2018-09-27

## 2018-09-27 LAB
BASOPHILS # BLD AUTO: 0.05 THOUSANDS/ΜL (ref 0–0.1)
BASOPHILS NFR BLD AUTO: 1 % (ref 0–1)
EOSINOPHIL # BLD AUTO: 0.16 THOUSAND/ΜL (ref 0–0.61)
EOSINOPHIL NFR BLD AUTO: 2 % (ref 0–6)
ERYTHROCYTE [DISTWIDTH] IN BLOOD BY AUTOMATED COUNT: 12.9 % (ref 11.6–15.1)
HCT VFR BLD AUTO: 33.6 % (ref 34.8–46.1)
HGB BLD-MCNC: 10.9 G/DL (ref 11.5–15.4)
IMM GRANULOCYTES # BLD AUTO: 0.08 THOUSAND/UL (ref 0–0.2)
IMM GRANULOCYTES NFR BLD AUTO: 1 % (ref 0–2)
LYMPHOCYTES # BLD AUTO: 1.73 THOUSANDS/ΜL (ref 0.6–4.47)
LYMPHOCYTES NFR BLD AUTO: 17 % (ref 14–44)
MCH RBC QN AUTO: 29.8 PG (ref 26.8–34.3)
MCHC RBC AUTO-ENTMCNC: 32.4 G/DL (ref 31.4–37.4)
MCV RBC AUTO: 92 FL (ref 82–98)
MONOCYTES # BLD AUTO: 1.04 THOUSAND/ΜL (ref 0.17–1.22)
MONOCYTES NFR BLD AUTO: 10 % (ref 4–12)
NEUTROPHILS # BLD AUTO: 7.28 THOUSANDS/ΜL (ref 1.85–7.62)
NEUTS SEG NFR BLD AUTO: 69 % (ref 43–75)
NRBC BLD AUTO-RTO: 0 /100 WBCS
PLATELET # BLD AUTO: 332 THOUSANDS/UL (ref 149–390)
PMV BLD AUTO: 9.4 FL (ref 8.9–12.7)
RBC # BLD AUTO: 3.66 MILLION/UL (ref 3.81–5.12)
WBC # BLD AUTO: 10.34 THOUSAND/UL (ref 4.31–10.16)

## 2018-09-27 PROCEDURE — 99232 SBSQ HOSP IP/OBS MODERATE 35: CPT | Performed by: INTERNAL MEDICINE

## 2018-09-27 PROCEDURE — 85025 COMPLETE CBC W/AUTO DIFF WBC: CPT | Performed by: PHYSICIAN ASSISTANT

## 2018-09-27 PROCEDURE — 99239 HOSP IP/OBS DSCHRG MGMT >30: CPT | Performed by: PHYSICIAN ASSISTANT

## 2018-09-27 RX ORDER — GUAIFENESIN 1200 MG/1
600 TABLET, EXTENDED RELEASE ORAL EVERY 12 HOURS SCHEDULED
Qty: 30 TABLET | Refills: 0 | Status: SHIPPED | OUTPATIENT
Start: 2018-09-27 | End: 2018-10-17 | Stop reason: ALTCHOICE

## 2018-09-27 RX ORDER — DOCUSATE SODIUM 100 MG/1
100 CAPSULE, LIQUID FILLED ORAL 2 TIMES DAILY PRN
Qty: 10 CAPSULE | Refills: 0
Start: 2018-09-27 | End: 2018-10-23 | Stop reason: ALTCHOICE

## 2018-09-27 RX ORDER — CEFDINIR 300 MG/1
300 CAPSULE ORAL EVERY 12 HOURS SCHEDULED
Qty: 6 CAPSULE | Refills: 0 | Status: SHIPPED | OUTPATIENT
Start: 2018-09-28 | End: 2018-10-01

## 2018-09-27 RX ORDER — ASCORBIC ACID 250 MG
250 TABLET ORAL 2 TIMES DAILY
Refills: 0
Start: 2018-09-27 | End: 2019-03-13

## 2018-09-27 RX ORDER — NICOTINE 21 MG/24HR
1 PATCH, TRANSDERMAL 24 HOURS TRANSDERMAL DAILY
Qty: 28 PATCH | Refills: 0 | Status: SHIPPED | OUTPATIENT
Start: 2018-09-28 | End: 2019-03-13

## 2018-09-27 RX ORDER — BENZONATATE 100 MG/1
100 CAPSULE ORAL 3 TIMES DAILY
Qty: 30 CAPSULE | Refills: 0 | Status: SHIPPED | OUTPATIENT
Start: 2018-09-27 | End: 2018-10-23 | Stop reason: ALTCHOICE

## 2018-09-27 RX ORDER — FERROUS SULFATE 325(65) MG
325 TABLET ORAL 2 TIMES DAILY WITH MEALS
Qty: 60 TABLET | Refills: 0 | Status: SHIPPED | OUTPATIENT
Start: 2018-09-27 | End: 2018-10-23 | Stop reason: SDDI

## 2018-09-27 RX ORDER — ALBUTEROL SULFATE 90 UG/1
2 AEROSOL, METERED RESPIRATORY (INHALATION) EVERY 4 HOURS PRN
Qty: 1 INHALER | Refills: 0 | Status: SHIPPED | OUTPATIENT
Start: 2018-09-27 | End: 2018-10-17 | Stop reason: SDUPTHER

## 2018-09-27 RX ORDER — AZITHROMYCIN 250 MG/1
250 TABLET, FILM COATED ORAL EVERY 24 HOURS
Qty: 1 TABLET | Refills: 0 | Status: SHIPPED | OUTPATIENT
Start: 2018-09-28 | End: 2018-09-29

## 2018-09-27 RX ORDER — AZITHROMYCIN 250 MG/1
250 TABLET, FILM COATED ORAL EVERY 24 HOURS
Status: DISCONTINUED | OUTPATIENT
Start: 2018-09-27 | End: 2018-09-27 | Stop reason: HOSPADM

## 2018-09-27 RX ADMIN — FERROUS SULFATE TAB 325 MG (65 MG ELEMENTAL FE) 325 MG: 325 (65 FE) TAB at 09:09

## 2018-09-27 RX ADMIN — NICOTINE 1 PATCH: 21 PATCH, EXTENDED RELEASE TRANSDERMAL at 09:15

## 2018-09-27 RX ADMIN — BENZONATATE 100 MG: 100 CAPSULE ORAL at 16:36

## 2018-09-27 RX ADMIN — BENZONATATE 100 MG: 100 CAPSULE ORAL at 09:08

## 2018-09-27 RX ADMIN — SODIUM CHLORIDE 75 ML/HR: 0.9 INJECTION, SOLUTION INTRAVENOUS at 09:33

## 2018-09-27 RX ADMIN — ENOXAPARIN SODIUM 40 MG: 40 INJECTION SUBCUTANEOUS at 09:08

## 2018-09-27 RX ADMIN — OXYCODONE HYDROCHLORIDE AND ACETAMINOPHEN 250 MG: 500 TABLET ORAL at 09:05

## 2018-09-27 RX ADMIN — NYSTATIN 500000 UNITS: 100000 SUSPENSION ORAL at 11:46

## 2018-09-27 RX ADMIN — CEFTRIAXONE 1000 MG: 1 INJECTION, POWDER, FOR SOLUTION INTRAMUSCULAR; INTRAVENOUS at 13:04

## 2018-09-27 RX ADMIN — GUAIFENESIN 1200 MG: 600 TABLET, EXTENDED RELEASE ORAL at 09:09

## 2018-09-27 RX ADMIN — FERROUS SULFATE TAB 325 MG (65 MG ELEMENTAL FE) 325 MG: 325 (65 FE) TAB at 16:36

## 2018-09-27 RX ADMIN — AZITHROMYCIN 250 MG: 250 TABLET, FILM COATED ORAL at 13:37

## 2018-09-27 RX ADMIN — NYSTATIN 500000 UNITS: 100000 SUSPENSION ORAL at 09:10

## 2018-09-27 NOTE — ASSESSMENT & PLAN NOTE
· Patient presented with respiratory symptoms of malaise, poor appetite, laryngitis, dry cough and subjective fever  · Noted to have right perihilar pneumonia on chest x-ray  · CT chest showed multifocal pneumonia without empyema  · procalcitonin level mildly elevated at 0 15, repeat ordered noted to be better at 0 08  · urine Legionella and strep and influenza/RSV negative  · Incentive spirometry/flutter valve  · Recommend ongoing treatment for community-acquired pneumonia with ceftriaxone and Zithromax, currently day 4,  respiratory protocol, and cough medications/mucolytic regimen    · Monitor O2 needs-today much improved  · Appreciate pulmonology evaluation given slow improvement and anticipated need for follow-up

## 2018-09-27 NOTE — PLAN OF CARE
Problem: DISCHARGE PLANNING - CARE MANAGEMENT  Goal: Discharge to post-acute care or home with appropriate resources  INTERVENTIONS:  - Conduct assessment to determine patient/family and health care team treatment goals, and need for post-acute services based on payer coverage, community resources, and patient preferences, and barriers to discharge  - Address psychosocial, clinical, and financial barriers to discharge as identified in assessment in conjunction with the patient/family and health care team  - Arrange appropriate level of post-acute services according to patients   needs and preference and payer coverage in collaboration with the physician and health care team  - Communicate with and update the patient/family, physician, and health care team regarding progress on the discharge plan  - Arrange appropriate transportation to post-acute venues  Outcome: Completed Date Met: 09/27/18  LOS 3 days  Pt is not a bundle or a readmission  Pt lives with her 79 y/o mother in a 1 story home in [place] with 2 RADHIKA  She does not usually require any DME but recently had surgery on her foot and has been using a walker to ambulate  She is self care for all ADLs, and has hx of IP rehab or HHC  She uses [facility] on [address], has Rx insurance, and no difficulty affording meds  She works in the [employer] but is currently out of work because of her foot  The patient can drive but does not have a car  She walks to work and her daughter transports her wherever else she needs to go  Pt does not have POA or LW and is not interested in any info  Pt states daughter will transport her at d/c  At this time, pt denies any dcp needs  CM name and role reviewed  Discharge Checklist reviewed and CM will continue to monitor for progress toward discharge goals in nursing and provider rounds      CM reviewed discharge planning process including the following: identifying caregivers at home, preference for d/c planning needs, [facility] Meds to Bed program, availability of treatment team to discuss questions or concerns patient and/or family may have regarding diagnosis, plan of care, old or new medications and discharge planning   CM will continue to follow for care coordination and update assessment as necessary

## 2018-09-27 NOTE — PROGRESS NOTES
Progress Note - Pulmonary   Magdalene Klinefelter 66 y o  female MRN: [id number]  Unit/Bed#: -01 Encounter: [id number]      Assessment:  1  Multifocal community-acquired pneumonia  2  Acute hypoxic respiratory failure-improved  3  Probable underlying COPD of unclear severity without exacerbation  4   Tobacco abuse      09/26/2018 CT of chest showing multifocal pneumonia with small bilateral effusions  Right greater add that left    White count trending down and procalcitonin normal    Plan:  1  Continue IV Zithromax and Rocephin to complete 7 day course  2   Oxygen p r n  to maintain O2 saturation greater than 88%  3   Pulmonary toilet/airway clearance protocol  Continue incentive spirometry  4  Tobacco cessation discussed  Nicotine replacement therapy  5  Sputum Gram stain culture pending  6  Repeat chest x-ray in 4-6 weeks  7  Patient will need outpatient pulmonary function testing  Outpatient Pulmonary follow-up as per discharge instructions  Subjective:   Patient seen and examined  Patient states breathing much easier today compared with yesterday  Currently on room air and cough has improved which is nonproductive  Objective:   Vitals: Blood pressure 132/69, pulse 91, temperature 98 °F (36 7 °C), temperature source Oral, resp  rate 16, height 5' 4" (1 626 m), weight 74 kg (163 lb 2 3 oz), SpO2 95 % , room-air, Body mass index is 28 kg/m²        Intake/Output Summary (Last 24 hours) at 09/27/18 1159  Last data filed at 09/27/18 0926   Gross per 24 hour   Intake             1180 ml   Output             1450 ml   Net             -270 ml         Physical Exam  Gen: Awake, alert, oriented x 3, no acute distress  HEENT: Mucous membranes moist  NECK: No accessory muscle use  Cardiac: Regular, single S1, single S2, no murmurs  Lungs:  Diminished breath sounds throughout  Abdomen: normoactive bowel sounds, soft nontender, nondistended, no rebound or rigidity, no guarding  Extremities: no cyanosis, no clubbing, no edema  Labs: I have personally reviewed pertinent lab results  , ABG: No results found for: PHART, SCY1QML, PO2ART, ZGS3GYD, V8UBLPCW, BEART, SOURCE, BNP: No results found for: BNP, CBC:   Lab Results   Component Value Date    WBC 10 34 (H) 09/27/2018    HGB 10 9 (L) 09/27/2018    HCT 33 6 (L) 09/27/2018    MCV 92 09/27/2018     09/27/2018    MCH 29 8 09/27/2018    MCHC 32 4 09/27/2018    RDW 12 9 09/27/2018    MPV 9 4 09/27/2018    NRBC 0 09/27/2018   , CMP: No results found for: NA, K, CL, CO2, ANIONGAP, BUN, CREATININE, GLUCOSE, CALCIUM, AST, ALT, ALKPHOS, PROT, ALBUMIN, BILITOT, EGFR, PT/INR: No results found for: PT, INR, Troponin: No results found for: TROPONINI  Imaging and other studies: I have personally reviewed pertinent reports     and I have personally reviewed pertinent films in PACS      Roger Perez PA-C

## 2018-09-27 NOTE — ASSESSMENT & PLAN NOTE
· Patient has never had pulmonary function test to determine whether she may have underlying COPD but has longstanding > 40 year history of tobacco use  · Reports that she is ready to quit  · Recommend follow up after discharge as she likely has elements of hypoxia from undiagnosed COPD  · Continue Xopenex/respiratory protocol  · Continue nicotine patch

## 2018-09-27 NOTE — DISCHARGE SUMMARY
Discharge- Kaushal Sidhu 1955, 66 y o  female MRN: [id number]    Unit/Bed#: -01 Encounter: [id number]    Primary Care Provider: Sharath Richards DO   Date and time admitted to hospital: 9/24/2018 12:20 PM  * Community acquired pneumonia of right lung   Assessment & Plan    · Patient presented with respiratory symptoms of malaise, poor appetite, laryngitis, dry cough and subjective fever  · Noted to have right perihilar pneumonia on chest x-ray  · CT chest showed multifocal pneumonia without empyema  · procalcitonin level mildly elevated at 0 15, repeat ordered noted to be better at 0 08  · urine Legionella and strep and influenza/RSV negative  · Incentive spirometry/flutter valve  · Recommend ongoing treatment for community-acquired pneumonia with ceftriaxone and Zithromax, currently day 4,  respiratory protocol, and cough medications/mucolytic regimen  · Monitor O2 needs-today much improved  · Appreciate pulmonology evaluation given slow improvement and anticipated need for follow-up     Hypoxiaresolved as of 9/27/2018   Assessment & Plan    · Patient had been on anywhere from 2-3 L of oxygen since admission, with O2 sat 86% on room air at rest  · Highly suspect she has underlying COPD but has never had formal testing  · appreciate pulmonology evaluation  · continue incentive spirometry and flutter valve today  · Successfully weaned off of oxygen and will not require it at discharge; I personally checked the patient's O2 sat at rest and with ambulation and noted it to remain at approximately 95%  · Not noted to be wheezing therefore did not feel she required steroids     Iron deficiency anemia   Assessment & Plan    · Patient noted to have substantial drop in her hemoglobin from 12 4 three weeks ago to 9 2, then simona and remained stable at 10 9   Monitored trend, no further drop noted  · Initiated iron + vitamin C  · No reports of bleeding noted; patient did sustain a mechanical fall on September 15th but does not have any visible evidence of hematoma  · Should have routine colonoscopy as an outpatient  · Patient did have foot surgery on September 14th but I spoke directly with her podiatrist and there was no substantial blood loss from the surgery whatsoever       Tobacco abuse   Assessment & Plan    · Patient has never had pulmonary function test to determine whether she may have underlying COPD but has longstanding > 40 year history of tobacco use  · Reports that she is ready to quit  · Recommend follow up after discharge as she likely has elements of hypoxia from undiagnosed COPD  · Continue Xopenex/respiratory protocol  · Continue nicotine patch       Discharging Physician / Practitioner: Santiago Rodriguez PA-C  PCP: Abebe Luz DO  Admission Date:   Admission Orders     Ordered        09/24/18 1359  Inpatient Admission (expected length of stay for this patient is greater than two midnights)  Once             Discharge Date: 09/27/18    Resolved Problems  Date Reviewed: 9/27/2018          Resolved    Hypoxia 9/27/2018     Resolved by  Santiago Rodriguez PA-C          Consultations During Hospital Stay:  · Pulmonology    Procedures Performed:     · CT of the chest September 26= multifocal pneumonia  Small right and tiny left pleural effusions    Significant Findings / Test Results:     · See above    Incidental Findings:   · Mild anemia     Test Results Pending at Discharge (will require follow up):   · none     Outpatient Tests Requested:  · Routine colonoscopy if she has not had 1 given her iron deficiency anemia    Complications:  Hypoxia and underlying tobacco abuse    Reason for Admission:   Respiratory illness    Hospital Course:     Carolin Garcia is a 66 y o  female patient who originally presented to the hospital on 9/24/2018 due to respiratory complaints of cough as well as malaise, fever, and loss of voice after being exposed to a family member with respiratory illness     She had leukocytosis but no other sepsis criteria, and chest x-ray did reveal evidence of right-sided perihilar pneumonia  She was started on IV ceftriaxone and Zithromax , and she has received 4 doses while in the hospital in addition to supportive care with mucolytics, oxygen, and IV fluids  Patient was very slow to recover overall and was tested for influenza but found to be negative  It is noted that she does have underlying longstanding tobacco abuse and likely has some element of COPD, though she was never tested  Given her ongoing hypoxia, we did request a consultation from pulmonology  They have recommended outpatient follow-up and did perform a CT of her chest as well which showed multifocal pneumonia but no evidence of significant effusion  Patient is willing to stop smoking and will also be given an albuterol inhaler as needed, although she was not noted to be significantly wheezing  She has been successfully weaned off of oxygen and was feeling improved at time of discharge  Please note that she was incidentally found to be mildly anemic  She had no evidence of blood loss but was iron deficient  She was started on iron supplementation and vitamin-C and was recommended to have outpatient follow-up with her family doctor but discharged hemoglobin was stable at 10 9  In addition she will follow up with her podiatrist status post recent foot surgery  Please see above list of diagnoses and related plan for additional information  Condition at Discharge: fair     Discharge Day Visit / Exam:     Subjective:  Patient reports that she is doing better overnight but she still has not been expectorated eating anything but does state she has been doing incentive spirometry and flutter valve more often  She has been taken off oxygen according to her nurse  She seems to be eating and drinking a bit better  She is not reporting any chest pain or any severe shortness of breath or wheezing    Vitals: Blood Pressure: 132/69 (09/27/18 0730)  Pulse: 91 (09/27/18 0730)  Temperature: 98 °F (36 7 °C) (09/27/18 0730)  Temp Source: Oral (09/27/18 0730)  Respirations: 16 (09/27/18 0730)  Height: 5' 4" (162 6 cm) (09/24/18 1507)  Weight - Scale: 74 kg (163 lb 2 3 oz) (09/24/18 1222)  SpO2: 95 % (09/27/18 1029)  Exam:   Physical Exam   Constitutional: She appears well-developed and well-nourished  No distress  Looks overall much better to me today  Not noted to be as fatigued  HENT:   Head: Normocephalic and atraumatic  Eyes: Conjunctivae are normal  Right eye exhibits no discharge  Left eye exhibits no discharge  No scleral icterus  Neck: Neck supple  Cardiovascular: Normal rate, regular rhythm and normal heart sounds  No murmur heard  Pulmonary/Chest: Effort normal  No stridor  No respiratory distress  She has no wheezes  She has decreased breath sounds throughout all lung fields  She is not dyspneic during conversation  No tachypnea noted  Only very mild cough noted  No wheezes  No supplemental oxygen in place   Abdominal: Soft  She exhibits no distension  There is no tenderness  Musculoskeletal: She exhibits no edema or deformity  Neurological: She is alert  Patient is awake alert and interactive  She follows commands appropriately  No confusion  Ambulates with a roller walker with her foot in her surgical shoe successfully, with no focal deficits noted   Skin: Skin is warm and dry  No rash noted  She is not diaphoretic  No erythema  No pallor  Psychiatric: She has a normal mood and affect  Her behavior is normal  Judgment and thought content normal    Vitals reviewed  Discussion with Family: called and left a message on patient's daughters machine    Discharge instructions/Information to patient and family:   See after visit summary for information provided to patient and family        Provisions for Follow-Up Care:  See after visit summary for information related to follow-up care and any pertinent home health orders  Disposition:     Home    For Discharges to [facility] SNF:   · Not Applicable to this Patient - Not Applicable to this Patient    Planned Readmission: none     Discharge Statement:  I spent 40 minutes discharging the patient  This time was spent on the day of discharge  I had direct contact with the patient on the day of discharge  Greater than 50% of the total time was spent examining patient, answering all patient questions, arranging and discussing plan of care with patient as well as directly providing post-discharge instructions  Additional time then spent on discharge activities  Bedside nursing rounds performed  Case discussed with pulmonology attending  Came back and checked with patient at the end of the day to ensure she did not have any additional questions for discharge and she feels ready to go  Discharge Medications:  See after visit summary for reconciled discharge medications provided to patient and family        ** Please Note: This note has been constructed using a voice recognition system **

## 2018-09-27 NOTE — ASSESSMENT & PLAN NOTE
· Patient had been on anywhere from 2-3 L of oxygen since admission, with O2 sat 86% on room air at rest  · Highly suspect she has underlying COPD but has never had formal testing  · appreciate pulmonology evaluation  · continue incentive spirometry and flutter valve today  · Successfully weaned off of oxygen and will not require it at discharge; I personally checked the patient's O2 sat at rest and with ambulation and noted it to remain at approximately 95%  · Not noted to be wheezing therefore did not feel she required steroids

## 2018-09-27 NOTE — ASSESSMENT & PLAN NOTE
· Patient noted to have substantial drop in her hemoglobin from 12 4 three weeks ago to 9 2, then simona and remained stable at 10 9   Monitored trend, no further drop noted  · Initiated iron + vitamin C  · No reports of bleeding noted; patient did sustain a mechanical fall on September 15th but does not have any visible evidence of hematoma  · Should have routine colonoscopy as an outpatient  · Patient did have foot surgery on September 14th but I spoke directly with her podiatrist and there was no substantial blood loss from the surgery whatsoever

## 2018-09-27 NOTE — SOCIAL WORK
LOS 3 days  Pt is not a bundle or a readmission  Pt lives with her 79 y/o mother in a 1 story home in [place] with 2 RADHIKA  She does not usually require any DME but recently had surgery on her foot and has been using a walker to ambulate  She is self care for all ADLs, and has hx of IP rehab or HHC  She uses [facility] on [address], has Rx insurance, and no difficulty affording meds  She works in the [employer] at [employer] but is currently out of work because of her foot  The patient can drive but does not have a car  She walks to work and her daughter transports her wherever else she needs to go  Pt does not have POA or LW and is not interested in any info  Pt states daughter will transport her at d/c  At this time, pt denies any dcp needs  CM name and role reviewed  Discharge Checklist reviewed and CM will continue to monitor for progress toward discharge goals in nursing and provider rounds  CM reviewed discharge planning process including the following: identifying caregivers at home, preference for d/c planning needs, [facility] Med[facility] to Bed program, availability of treatment team to discuss questions or concerns patient and/or family may have regarding diagnosis, plan of care, old or new medications and discharge planning   CM will continue to follow for care coordination and update assessment as necessary

## 2018-09-28 ENCOUNTER — TRANSITIONAL CARE MANAGEMENT (OUTPATIENT)
Dept: INTERNAL MEDICINE CLINIC | Facility: CLINIC | Age: 63
End: 2018-09-28

## 2018-09-29 LAB
BACTERIA BLD CULT: NORMAL
BACTERIA BLD CULT: NORMAL

## 2018-10-02 ENCOUNTER — TRANSITIONAL CARE MANAGEMENT (OUTPATIENT)
Dept: INTERNAL MEDICINE CLINIC | Facility: CLINIC | Age: 63
End: 2018-10-02

## 2018-10-12 ENCOUNTER — OFFICE VISIT (OUTPATIENT)
Dept: INTERNAL MEDICINE CLINIC | Facility: CLINIC | Age: 63
End: 2018-10-12
Payer: COMMERCIAL

## 2018-10-12 ENCOUNTER — APPOINTMENT (OUTPATIENT)
Dept: LAB | Facility: CLINIC | Age: 63
End: 2018-10-12
Payer: COMMERCIAL

## 2018-10-12 VITALS
OXYGEN SATURATION: 97 % | WEIGHT: 135.8 LBS | BODY MASS INDEX: 23.18 KG/M2 | SYSTOLIC BLOOD PRESSURE: 136 MMHG | HEART RATE: 92 BPM | TEMPERATURE: 98.2 F | DIASTOLIC BLOOD PRESSURE: 88 MMHG | HEIGHT: 64 IN

## 2018-10-12 DIAGNOSIS — Z71.6 ENCOUNTER FOR SMOKING CESSATION COUNSELING: ICD-10-CM

## 2018-10-12 DIAGNOSIS — D64.9 ANEMIA, UNSPECIFIED TYPE: ICD-10-CM

## 2018-10-12 DIAGNOSIS — M25.50 JOINT PAIN: ICD-10-CM

## 2018-10-12 DIAGNOSIS — Z12.39 SCREENING FOR BREAST CANCER: Primary | ICD-10-CM

## 2018-10-12 DIAGNOSIS — J44.9 COPD (CHRONIC OBSTRUCTIVE PULMONARY DISEASE) (HCC): ICD-10-CM

## 2018-10-12 PROCEDURE — 99214 OFFICE O/P EST MOD 30 MIN: CPT | Performed by: INTERNAL MEDICINE

## 2018-10-12 PROCEDURE — 86430 RHEUMATOID FACTOR TEST QUAL: CPT

## 2018-10-12 PROCEDURE — 36415 COLL VENOUS BLD VENIPUNCTURE: CPT

## 2018-10-12 PROCEDURE — 86200 CCP ANTIBODY: CPT

## 2018-10-12 PROCEDURE — 1111F DSCHRG MED/CURRENT MED MERGE: CPT | Performed by: INTERNAL MEDICINE

## 2018-10-12 RX ORDER — ALBUTEROL SULFATE 2.5 MG/3ML
2.5 SOLUTION RESPIRATORY (INHALATION) ONCE
Status: DISCONTINUED | OUTPATIENT
Start: 2018-10-12 | End: 2018-10-12

## 2018-10-12 RX ORDER — NICOTINE 21 MG/24HR
1 PATCH, TRANSDERMAL 24 HOURS TRANSDERMAL EVERY 24 HOURS
Qty: 28 PATCH | Refills: 0 | Status: SHIPPED | OUTPATIENT
Start: 2018-10-12 | End: 2018-10-17 | Stop reason: CLARIF

## 2018-10-12 RX ORDER — ACETAMINOPHEN 500 MG
1000 TABLET ORAL 2 TIMES DAILY
Qty: 90 TABLET | Refills: 1 | Status: SHIPPED | OUTPATIENT
Start: 2018-10-12 | End: 2019-03-13

## 2018-10-12 NOTE — PROGRESS NOTES
CLINIC VISIT NOTE  Benita Pod   66 y o  female   MRN: [id number]    ASSESSMENT/PLAN:  Diagnoses and all orders for this visit:    Screening for breast cancer  -     Mammo screening bilateral w cad; Future      Possible COPD (chronic obstructive pulmonary disease) (HCC)  -     albuterol inhalation solution 2 5 mg; Take 3 mL (2 5 mg total) by nebulization once   -     Cancel: POCT spirometry  with bronchodilator    -patient hypoxic in hospital, requiring 2-3 Liters of oxygen therapy to maintain saturation over 90%  -over 40 year smoking history 1ppd= 40 pack years, more recently the patient reports to smoking 2 or 3 cigarettes a day  -ambulated patient around clinic, initial 02 sat 97% at rest, desat down to 94% with ambulation  Plan: pulmonology is following, patient has followup appt next week with pulmonology, will perform spirometry with them  Anemia of Chronic Disease  -found to be anemic during recent admission, hemoglobin of 9 5 with improvement to 10 9 before discharge  Three weeks prior to presentation on 9/5/18 her hemoglobin was 12 5    -started on Iron and vitamin C, instructed to take iron every other day, but the patient cannot tolerate oral therapy secondary to nausea  -patient had left hallux fusion by podiatry on September 14th with no substantial blood loss reported  -patient had a mechanical fall on September 15th but does not have visible evidence of a hematoma  -patient has been taking motrin four 800mg motrin pills every 4-6 hours for joint pain  Denies abdominal pain, heartburn, melena, weight changes  Plan: Patient is 66years old with anemia and no history of colonoscopy, recommended colonoscopy and patient is agreeable  GI referral for colonoscopy sent   -Instructed patient to stop taking motrin and any other NSAID, will start her on prescription tylenol 1000mg BID  -Will give patient IV infusion of iron once a week, for two occurrences         Tobacco abuse: -over 40 year smoking history, 1/4ppd historically, but presently closer to 1 pack a week, 2-3 cigs a day    -patient is currently cigarette free for 7 days   -She is using the 21mg nicotine patch currently  Plan: Identified major trigger is being around other smokers, she will attempt to avoid them for now  Will continue nicotine patch, will wean down to 14mg nicotine patch by next followup visit in 5 weeks  Patient reports good success in past with chantix, if nicotine patch does not work, will trial chantix  Joint Pain:  -most likely etiology is osteoarthritis, but will test for RA   -Patient reports 2 year history of joint pain with worsening of symptoms in the past 6 months, located bilaterally in her hands, with pain in the morning that gets worse throughout the day with use   -very slight ulnar deviation noted, no erythema, some swelling and nodules noted  Patient denies numbness/loss of sensation, tingling, ROM and strength is intact  -patient works at a [employer] and uses her hands daily, complains of pain and stiffness after a day at work  Plan: RF, anti-cccp, to assess for RA in light of anemia and symptoms  Tylenol 1000mg BID        Health Maintenance:  -Mammogram referral given to patient        Schedule a follow-up appointment in 5 weeks  Chief Complaint: Follow-up visit from recent hospital discharge  History of Present Illness:  67 y/o female with PMHx of hypertension  Presented to [facility] on 9/24/18 with complaints general malaise, subjective fever, dry cough, congestion, sore throat, myalgias, with onset 3 days ago  She had foot surgery on September 14th and went to stay with her daughter for assistance  At the time the daughter had a respiratory illness  The patient reported that her daughter had similar symptoms prior to the patient developing them as well       Upon presentation her vital signs with within normal limits, she was afebrile, but there was leukocytosis noted on CBC to 12 47  Lactic acid was not elevated and two sets of blood cultures did not show any growth  Chest xray showed a right prehilar pneumonia  CT chest showed multifocal pneumonia without empyema  She was treated with ceftriaxone and azithromycin  Influenza, legionella, strep antigen were all negative  During admission the patient was found to be in acute respiratory failure with oxygen saturation in the mid 80s on room air  She required between 2 to 3 liters of oxygen to maintain saturation, bringing up concern for underlying COPD in light of patients smoking history  During the course of hospital stay the patient was noted to be anemic at Hemoglobin of 9 5  Iron was down to 11, with TIBC of 229  MCV was 96 and RDW was 13 2  Hemoglobin 3 weeks prior to hospital presentation was 12 4  Today in clinic the patient states she is recovering well and feeling better since discharge  She does still complain of some dyspnea on exertion, necessitating albuterol use 2-3 times a week  She states she did not have the dyspnea on exertion before she was admitted for [facility]  She also has never had pneumonia in the past  The patient denies any SOB at rest, chest pain, fevers, chills, abdominal pain, nausea, vomiting, urinary changes, since the hospital discharge  She does complain of bilateral joint pain in her hands, that occur in the morning and get worse throughout the day with use  She is also here to discuss smoking cessation  Review of Systems   Constitutional: Negative for chills, diaphoresis, fatigue, fever and unexpected weight change  HENT: Negative for congestion, postnasal drip, rhinorrhea, sinus pain, sinus pressure and sore throat  Eyes: Negative for photophobia, pain, itching and visual disturbance  Respiratory: Positive for cough and shortness of breath  Negative for chest tightness and wheezing  Dyspnea on exertion only    Chronic smokers cough not changed from baseline  Cardiovascular: Negative for chest pain, palpitations and leg swelling  Gastrointestinal: Negative for abdominal distention, abdominal pain, anal bleeding, blood in stool, constipation, diarrhea, nausea and vomiting  Genitourinary: Negative for difficulty urinating, dysuria, flank pain and frequency  Musculoskeletal: Positive for arthralgias and joint swelling  Negative for back pain, gait problem and myalgias  Skin: Negative for color change, pallor, rash and wound  Neurological: Negative for dizziness, tremors, syncope, weakness, light-headedness, numbness and headaches  Hematological: Negative for adenopathy  Does not bruise/bleed easily  All other systems reviewed and are negative  Objective:  Vitals:    10/12/18 0828   BP: 136/88   BP Location: Right arm   Patient Position: Sitting   Cuff Size: Adult   Pulse: 92   Temp: 98 2 °F (36 8 °C)   TempSrc: Oral   Weight: 61 6 kg (135 lb 12 9 oz)   Height: 5' 4" (1 626 m)     Physical Exam   Constitutional: She appears well-developed and well-nourished  No distress  HENT:   Head: Normocephalic and atraumatic  Eyes: Pupils are equal, round, and reactive to light  EOM are normal  Right eye exhibits no discharge  Left eye exhibits no discharge  No scleral icterus  Neck: Neck supple  No JVD present  No thyromegaly present  Cardiovascular: Normal rate, regular rhythm, normal heart sounds and intact distal pulses  Exam reveals no gallop and no friction rub  No murmur heard  Pulmonary/Chest: Effort normal and breath sounds normal  No respiratory distress  She has no wheezes  She has no rales  She exhibits no tenderness  Abdominal: Soft  Bowel sounds are normal  She exhibits no distension and no mass  There is no tenderness  There is no guarding  Musculoskeletal: She exhibits no edema or tenderness  Lymphadenopathy:     She has no cervical adenopathy  Skin: No rash noted  She is not diaphoretic   No erythema  No pallor  Nursing note and vitals reviewed          Current Outpatient Prescriptions:     albuterol (PROVENTIL HFA,VENTOLIN HFA) 90 mcg/act inhaler, Inhale 2 puffs every 4 (four) hours as needed for wheezing, Disp: 1 Inhaler, Rfl: 0    benzonatate (TESSALON PERLES) 100 mg capsule, Take 1 capsule (100 mg total) by mouth 3 (three) times a day, Disp: 30 capsule, Rfl: 0    nicotine (NICODERM CQ) 21 mg/24 hr TD 24 hr patch, Place 1 patch on the skin daily, Disp: 28 patch, Rfl: 0    ascorbic acid (VITAMIN C) 250 MG tablet, Take 1 tablet (250 mg total) by mouth 2 (two) times a day (Patient not taking: Reported on 10/12/2018 ), Disp: , Rfl: 0    docusate sodium (COLACE) 100 mg capsule, Take 1 capsule (100 mg total) by mouth 2 (two) times a day as needed for constipation (Patient not taking: Reported on 10/12/2018 ), Disp: 10 capsule, Rfl: 0    ferrous sulfate 325 (65 Fe) mg tablet, Take 1 tablet (325 mg total) by mouth 2 (two) times a day with meals (Patient not taking: Reported on 10/12/2018 ), Disp: 60 tablet, Rfl: 0    Guaifenesin 1200 MG TB12, Take 0 5 tablets (600 mg total) by mouth every 12 (twelve) hours (Patient not taking: Reported on 10/12/2018 ), Disp: 30 tablet, Rfl: 0    Current Facility-Administered Medications:     albuterol inhalation solution 2 5 mg, 2 5 mg, Nebulization, Once, Ang Garcia DO    Past Medical History:   Diagnosis Date    Anxiety     Arthritis     Chronic pain disorder     right hip    Hard of hearing     History of transfusion 05/01/2016    Hypertension     Osteoporosis     Wears dentures      Past Surgical History:   Procedure Laterality Date    CARPAL TUNNEL RELEASE Right     FRACTURE SURGERY      Right hip ORIF    HIP SURGERY      NEUROPLASTY      Decompression Median Nerve At Carpal Tunnel    MA FUSION BIG TOE,MT-P JT Left 9/14/2018    Procedure: 1ST MTPJ FUSION;  Surgeon: Tashia Wild DPM;  Location: [facility] OR;  Service: Podiatry    MA OPEN FIX INTER/SUBTROCH FX,IMPLNT Right 4/29/2016    Procedure: Removal of deep implant; IM inpending right femur fracture;  Surgeon: Gina Dutta MD;  Location:  MAIN OR;  Service: Orthopedics    TONSILLECTOMY      TUBAL LIGATION       Social History     Social History    Marital status:      Spouse name: N/A    Number of children: N/A    Years of education: N/A     Occupational History    Not on file  Social History Main Topics    Smoking status: Former Smoker     Packs/day: 0 25     Years: 47 00     Types: Cigarettes    Smokeless tobacco: Never Used      Comment: pt "trying to quit stopped 4 days ago"1 pack a week/2 cigs a day; previously heavier    Alcohol use No    Drug use: No    Sexual activity: No      Comment:      Other Topics Concern    Not on file     Social History Narrative    Current Diet High in Sugar Includes high sugar beverages    Daily Coffee Consumption(1 Cups/Day)     Family History   Problem Relation Age of Onset    Hypertension Mother     Thyroid disease Mother     Osteoporosis Mother     Hypertension Father     Aneurysm Father     Arthritis Family     Hyperthyroidism Family     Osteoporosis Family      ==  DO Radha Esparza 75 Internal Medicine PGY-1    [facility]  [address]   [facility] , [address] [address] [facility] [address] [facility]  Office: [phone]  Fax: [phone]

## 2018-10-12 NOTE — TELEPHONE ENCOUNTER
Pt came to a f/u appointment with Dr Lula Quintero and per Dr Rachel Teresa pt does not need fit kit due to anemia  Pt will complete colonoscopy instead

## 2018-10-15 LAB
CCP IGA+IGG SERPL IA-ACNC: 21 UNITS (ref 0–19)
RHEUMATOID FACT SER QL LA: NEGATIVE

## 2018-10-17 ENCOUNTER — OFFICE VISIT (OUTPATIENT)
Dept: PULMONOLOGY | Facility: CLINIC | Age: 63
End: 2018-10-17
Payer: COMMERCIAL

## 2018-10-17 VITALS
SYSTOLIC BLOOD PRESSURE: 146 MMHG | BODY MASS INDEX: 23.73 KG/M2 | WEIGHT: 139 LBS | DIASTOLIC BLOOD PRESSURE: 86 MMHG | HEART RATE: 94 BPM | OXYGEN SATURATION: 95 % | TEMPERATURE: 98.1 F | HEIGHT: 64 IN

## 2018-10-17 DIAGNOSIS — J18.9 COMMUNITY ACQUIRED PNEUMONIA OF RIGHT UPPER LOBE OF LUNG: Primary | ICD-10-CM

## 2018-10-17 DIAGNOSIS — J44.9 COPD, SEVERITY TO BE DETERMINED (HCC): ICD-10-CM

## 2018-10-17 DIAGNOSIS — Z23 NEED FOR INFLUENZA VACCINATION: ICD-10-CM

## 2018-10-17 DIAGNOSIS — Z72.0 TOBACCO ABUSE: ICD-10-CM

## 2018-10-17 PROCEDURE — 90471 IMMUNIZATION ADMIN: CPT | Performed by: PHYSICIAN ASSISTANT

## 2018-10-17 PROCEDURE — 99213 OFFICE O/P EST LOW 20 MIN: CPT | Performed by: PHYSICIAN ASSISTANT

## 2018-10-17 PROCEDURE — 90682 RIV4 VACC RECOMBINANT DNA IM: CPT | Performed by: PHYSICIAN ASSISTANT

## 2018-10-17 RX ORDER — ALBUTEROL SULFATE 90 UG/1
2 AEROSOL, METERED RESPIRATORY (INHALATION) EVERY 4 HOURS PRN
Qty: 1 INHALER | Refills: 5 | Status: SHIPPED | OUTPATIENT
Start: 2018-10-17 | End: 2019-03-14

## 2018-10-17 NOTE — PROGRESS NOTES
Assessment:    1  Community acquired pneumonia of right upper lobe of lung ([address] )  albuterol (PROVENTIL HFA,VENTOLIN HFA) 90 mcg/act inhaler    XR chest pa & lateral   2  Tobacco abuse  Pulmonary function test   3  COPD, severity to be determined [facility])  Pulmonary function test   4  Need for influenza vaccination  influenza vaccine, [id number], quadrivalent, recombinant, PF, 0 5 mL, for patients 18 yr+ (FLUBLOK)           Plan:    Mrs Shell Talavera will continue her albuterol HFA p r n  for her likely underlying COPD  Patient scheduled for outpatient complete pulmonary function testing  She is scheduled for repeat PA and lateral chest x-ray in mid November 2018  to verify resolution of her pneumonia  I encouraged patient to continue to stop smoking  Patient currently utilizing 21 mg nicotine patch  Patient received her influenza vaccination today  Patient scheduled follow-up again in approximately 3-4 months or sooner if needed  Patient instructed to call us with any questions/ concerns or worsening symptoms  Subjective:     Patient ID: Kamron Latham is a 66 y o  female  Chief Complaint:  HPI   51-year-old female here for hospital follow-up visit  Patient was admitted from 09/24/2000 18-9 07/20/2018 and we followed her for community-acquired pneumonia of the right lung   Patient is long-time smoker still smoking  Who quit approximately 2 weeks ago  She likely has COPD  Secondary to her smoking history with no prior pulmonary function testing  Patient states feeling much better since her hospitalization and denies minimal dyspnea with exertion  States able to walk her dog approximately 3 blocks  Before she developed some mild dyspnea with exertion  Denies any fevers, chills, cough, hemoptysis  States only requires her albuterol rescue inhaler maybe twice weekly  Review of Systems   Constitutional: Negative for chills, diaphoresis, fever and unexpected weight change     HENT: Negative for postnasal drip, sinus pressure, sneezing and sore throat  Respiratory: Negative for cough, chest tightness and wheezing  Shortness of breath:   Occasional dyspnea with exertion  Cardiovascular: Negative for chest pain, palpitations and leg swelling  Skin: Negative for rash  Neurological: Negative for dizziness and headaches  Current Outpatient Prescriptions on File Prior to Visit   Medication Sig Dispense Refill    acetaminophen (TYLENOL) 500 mg tablet Take 2 tablets (1,000 mg total) by mouth 2 (two) times a day 90 tablet 1    ascorbic acid (VITAMIN C) 250 MG tablet Take 1 tablet (250 mg total) by mouth 2 (two) times a day  0    benzonatate (TESSALON PERLES) 100 mg capsule Take 1 capsule (100 mg total) by mouth 3 (three) times a day 30 capsule 0    docusate sodium (COLACE) 100 mg capsule Take 1 capsule (100 mg total) by mouth 2 (two) times a day as needed for constipation 10 capsule 0    ferrous sulfate 325 (65 Fe) mg tablet Take 1 tablet (325 mg total) by mouth 2 (two) times a day with meals 60 tablet 0    nicotine (NICODERM CQ) 21 mg/24 hr TD 24 hr patch Place 1 patch on the skin daily 28 patch 0    [DISCONTINUED] albuterol (PROVENTIL HFA,VENTOLIN HFA) 90 mcg/act inhaler Inhale 2 puffs every 4 (four) hours as needed for wheezing 1 Inhaler 0    [DISCONTINUED] Guaifenesin 1200 MG TB12 Take 0 5 tablets (600 mg total) by mouth every 12 (twelve) hours (Patient not taking: Reported on 10/12/2018 ) 30 tablet 0    [DISCONTINUED] nicotine (NICODERM CQ) 14 mg/24hr TD 24 hr patch Place 1 patch on the skin every 24 hours 28 patch 0     No current facility-administered medications on file prior to visit        Past Medical History:   Diagnosis Date    Anxiety     Arthritis     Chronic pain disorder     right hip    Hard of hearing     History of transfusion 05/01/2016    Hypertension     Osteoporosis     Wears dentures          Objective:  /86 (BP Location: Right arm, Patient Position: Sitting) Pulse 94   Temp 98 1 °F (36 7 °C) (Tympanic)   Ht 5' 4" (1 626 m)   Wt 63 kg (139 lb)   SpO2 95%   BMI 23 86 kg/m²    room-air  Physical Exam   Constitutional: She appears well-developed and well-nourished  Non-toxic appearance  No distress  HENT:   Head: Normocephalic and atraumatic  Nose: Nose normal  No rhinorrhea or sinus tenderness  Mouth/Throat: Uvula is midline and oropharynx is clear and moist  No oropharyngeal exudate  Eyes: Pupils are equal, round, and reactive to light  Conjunctivae are normal  No scleral icterus  Neck: Neck supple  No tracheal deviation present  Cardiovascular: Normal rate, regular rhythm, normal heart sounds and intact distal pulses  Exam reveals no gallop and no friction rub  No murmur heard  Pulmonary/Chest: Effort normal  No respiratory distress  She has no wheezes  She has no rales  She exhibits no tenderness  Mildly diminished breath sounds diffusely   Abdominal: Soft  Bowel sounds are normal  There is no tenderness  Musculoskeletal: She exhibits no edema or tenderness  Lymphadenopathy:     She has no cervical adenopathy  Neurological: She is alert  Skin: Capillary refill takes less than 2 seconds  No rash noted  She is not diaphoretic  Psychiatric: She has a normal mood and affect  Nursing note and vitals reviewed

## 2018-10-19 ENCOUNTER — TELEPHONE (OUTPATIENT)
Dept: GASTROENTEROLOGY | Facility: CLINIC | Age: 63
End: 2018-10-19

## 2018-10-19 NOTE — TELEPHONE ENCOUNTER
Patient can be scheduled with any doctor for next available appointment for a colon screening if she calls back

## 2018-10-23 ENCOUNTER — HOSPITAL ENCOUNTER (OUTPATIENT)
Dept: INFUSION CENTER | Facility: HOSPITAL | Age: 63
Discharge: HOME | End: 2018-10-23
Payer: COMMERCIAL

## 2018-10-23 VITALS
TEMPERATURE: 98.1 F | HEART RATE: 80 BPM | RESPIRATION RATE: 18 BRPM | SYSTOLIC BLOOD PRESSURE: 140 MMHG | DIASTOLIC BLOOD PRESSURE: 80 MMHG

## 2018-10-23 PROCEDURE — 96365 THER/PROPH/DIAG IV INF INIT: CPT

## 2018-10-23 RX ORDER — CELECOXIB 100 MG/1
100 CAPSULE ORAL 2 TIMES DAILY
COMMUNITY
End: 2019-03-13

## 2018-10-23 RX ADMIN — IRON SUCROSE 200 MG: 20 INJECTION, SOLUTION INTRAVENOUS at 13:27

## 2018-10-24 ENCOUNTER — HOSPITAL ENCOUNTER (OUTPATIENT)
Dept: PULMONOLOGY | Facility: HOSPITAL | Age: 63
Discharge: HOME | End: 2018-10-24
Payer: COMMERCIAL

## 2018-10-24 ENCOUNTER — HOSPITAL ENCOUNTER (OUTPATIENT)
Dept: RADIOLOGY | Facility: HOSPITAL | Age: 63
Discharge: HOME | End: 2018-10-24
Payer: COMMERCIAL

## 2018-10-24 DIAGNOSIS — J18.9 COMMUNITY ACQUIRED PNEUMONIA OF RIGHT UPPER LOBE OF LUNG: ICD-10-CM

## 2018-10-24 DIAGNOSIS — J44.9 COPD, SEVERITY TO BE DETERMINED (HCC): ICD-10-CM

## 2018-10-24 DIAGNOSIS — Z72.0 TOBACCO ABUSE: ICD-10-CM

## 2018-10-24 PROCEDURE — 94760 N-INVAS EAR/PLS OXIMETRY 1: CPT

## 2018-10-24 PROCEDURE — 94726 PLETHYSMOGRAPHY LUNG VOLUMES: CPT | Performed by: INTERNAL MEDICINE

## 2018-10-24 PROCEDURE — 94729 DIFFUSING CAPACITY: CPT | Performed by: INTERNAL MEDICINE

## 2018-10-24 PROCEDURE — 94060 EVALUATION OF WHEEZING: CPT | Performed by: INTERNAL MEDICINE

## 2018-10-24 PROCEDURE — 94726 PLETHYSMOGRAPHY LUNG VOLUMES: CPT

## 2018-10-24 PROCEDURE — 94060 EVALUATION OF WHEEZING: CPT

## 2018-10-24 PROCEDURE — 94729 DIFFUSING CAPACITY: CPT

## 2018-10-24 PROCEDURE — 71046 X-RAY EXAM CHEST 2 VIEWS: CPT

## 2018-10-24 RX ORDER — ALBUTEROL SULFATE 2.5 MG/3ML
2.5 SOLUTION RESPIRATORY (INHALATION) ONCE
Status: COMPLETED | OUTPATIENT
Start: 2018-10-24 | End: 2018-10-24

## 2018-10-24 RX ADMIN — ALBUTEROL SULFATE 2.5 MG: 2.5 SOLUTION RESPIRATORY (INHALATION) at 13:02

## 2018-10-26 ENCOUNTER — TELEPHONE (OUTPATIENT)
Dept: PULMONOLOGY | Facility: HOSPITAL | Age: 63
End: 2018-10-26

## 2018-10-26 ENCOUNTER — HOSPITAL ENCOUNTER (OUTPATIENT)
Dept: INFUSION CENTER | Facility: HOSPITAL | Age: 63
Discharge: HOME | End: 2018-10-26
Payer: COMMERCIAL

## 2018-10-26 PROCEDURE — 96365 THER/PROPH/DIAG IV INF INIT: CPT

## 2018-10-26 RX ADMIN — IRON SUCROSE 200 MG: 20 INJECTION, SOLUTION INTRAVENOUS at 12:50

## 2018-10-26 NOTE — PLAN OF CARE
Problem: Potential for Falls  Goal: Patient will remain free of falls  INTERVENTIONS:  - Assess patient frequently for physical needs  -  Identify cognitive and physical deficits and behaviors that affect risk of falls    -  [place] fall precautions as indicated by assessment   - Educate patient/family on patient safety including physical limitations  - Instruct patient to call for assistance with activity based on assessment  - Modify environment to reduce risk of injury  - Consider OT/PT consult to assist with strengthening/mobility   Outcome: Progressing

## 2018-10-26 NOTE — PROGRESS NOTES
Appointment scheduled on 10/31/2018 for Mammo screening bilateral w cad ordered by Dr Carla Dominguez on 10/15/2018

## 2018-10-30 ENCOUNTER — HOSPITAL ENCOUNTER (OUTPATIENT)
Dept: INFUSION CENTER | Facility: HOSPITAL | Age: 63
Discharge: HOME | End: 2018-10-30
Payer: COMMERCIAL

## 2018-10-30 VITALS
SYSTOLIC BLOOD PRESSURE: 130 MMHG | HEART RATE: 76 BPM | TEMPERATURE: 98.1 F | RESPIRATION RATE: 20 BRPM | DIASTOLIC BLOOD PRESSURE: 70 MMHG

## 2018-10-30 PROCEDURE — 96365 THER/PROPH/DIAG IV INF INIT: CPT

## 2018-10-30 RX ADMIN — IRON SUCROSE 200 MG: 20 INJECTION, SOLUTION INTRAVENOUS at 12:43

## 2018-10-30 NOTE — PLAN OF CARE
Problem: Potential for Falls  Goal: Patient will remain free of falls  INTERVENTIONS:  - Assess patient frequently for physical needs  -  Identify cognitive and physical deficits and behaviors that affect risk of falls    -  [place] fall precautions as indicated by assessment   - Educate patient/family on patient safety including physical limitations  - Instruct patient to call for assistance with activity based on assessment  - Modify environment to reduce risk of injury  - Consider OT/PT consult to assist with strengthening/mobility   Outcome: Progressing

## 2018-10-31 ENCOUNTER — HOSPITAL ENCOUNTER (OUTPATIENT)
Dept: RADIOLOGY | Facility: HOSPITAL | Age: 63
Discharge: HOME | End: 2018-10-31
Payer: COMMERCIAL

## 2018-10-31 DIAGNOSIS — Z12.39 SCREENING FOR BREAST CANCER: ICD-10-CM

## 2018-10-31 PROCEDURE — 77067 SCR MAMMO BI INCL CAD: CPT

## 2018-11-01 ENCOUNTER — TELEPHONE (OUTPATIENT)
Dept: CCU | Facility: HOSPITAL | Age: 63
End: 2018-11-01

## 2018-11-01 ENCOUNTER — TELEPHONE (OUTPATIENT)
Dept: PULMONOLOGY | Facility: CLINIC | Age: 63
End: 2018-11-01

## 2018-11-06 ENCOUNTER — TRANSCRIBE ORDERS (OUTPATIENT)
Dept: ADMINISTRATIVE | Facility: HOSPITAL | Age: 63
End: 2018-11-06

## 2018-11-06 ENCOUNTER — HOSPITAL ENCOUNTER (OUTPATIENT)
Dept: ULTRASOUND IMAGING | Facility: CLINIC | Age: 63
Discharge: HOME | End: 2018-11-06
Payer: COMMERCIAL

## 2018-11-06 DIAGNOSIS — R92.8 ABNORMAL MAMMOGRAM: ICD-10-CM

## 2018-11-06 DIAGNOSIS — Z09 FOLLOW-UP EXAM, 3-6 MONTHS SINCE PREVIOUS EXAM: Primary | ICD-10-CM

## 2018-11-06 PROCEDURE — 76642 ULTRASOUND BREAST LIMITED: CPT

## 2018-11-13 ENCOUNTER — TELEPHONE (OUTPATIENT)
Dept: INTERNAL MEDICINE CLINIC | Facility: CLINIC | Age: 63
End: 2018-11-13

## 2018-11-13 DIAGNOSIS — Z71.6 ENCOUNTER FOR SMOKING CESSATION COUNSELING: Primary | ICD-10-CM

## 2018-11-13 RX ORDER — VARENICLINE TARTRATE 0.5 MG/1
0.5 TABLET, FILM COATED ORAL 2 TIMES DAILY
Qty: 150 TABLET | Refills: 0 | Status: SHIPPED | OUTPATIENT
Start: 2018-11-13 | End: 2019-03-13

## 2018-11-13 NOTE — TELEPHONE ENCOUNTER
Patient is asking for Dr Bernard Rios to call in [employer] to the pharmacy  She is back to smoking a pack every 3 days  She said she discussed this with him before

## 2018-11-14 ENCOUNTER — TELEPHONE (OUTPATIENT)
Dept: INTERNAL MEDICINE CLINIC | Facility: CLINIC | Age: 63
End: 2018-11-14

## 2018-12-18 ENCOUNTER — TELEPHONE (OUTPATIENT)
Dept: CCU | Facility: HOSPITAL | Age: 63
End: 2018-12-18

## 2018-12-18 DIAGNOSIS — J18.9 PNEUMONIA OF RIGHT LUNG DUE TO INFECTIOUS ORGANISM, UNSPECIFIED PART OF LUNG: Primary | ICD-10-CM

## 2019-01-21 PROBLEM — J18.9 COMMUNITY ACQUIRED PNEUMONIA OF RIGHT LUNG: Status: RESOLVED | Noted: 2018-09-24 | Resolved: 2019-01-21

## 2019-03-08 ENCOUNTER — APPOINTMENT (EMERGENCY)
Dept: RADIOLOGY | Facility: HOSPITAL | Age: 64
End: 2019-03-08
Payer: COMMERCIAL

## 2019-03-08 ENCOUNTER — HOSPITAL ENCOUNTER (EMERGENCY)
Facility: HOSPITAL | Age: 64
Discharge: HOME | End: 2019-03-08
Attending: EMERGENCY MEDICINE | Admitting: EMERGENCY MEDICINE
Payer: COMMERCIAL

## 2019-03-08 VITALS
HEART RATE: 96 BPM | RESPIRATION RATE: 16 BRPM | DIASTOLIC BLOOD PRESSURE: 64 MMHG | SYSTOLIC BLOOD PRESSURE: 126 MMHG | OXYGEN SATURATION: 97 % | HEIGHT: 64 IN | BODY MASS INDEX: 23.86 KG/M2 | TEMPERATURE: 98.6 F

## 2019-03-08 DIAGNOSIS — S32.599A: ICD-10-CM

## 2019-03-08 DIAGNOSIS — S32.512A CLOSED FRACTURE OF LEFT SUPERIOR PUBIC RAMUS, INITIAL ENCOUNTER: Primary | ICD-10-CM

## 2019-03-08 PROCEDURE — 73700 CT LOWER EXTREMITY W/O DYE: CPT

## 2019-03-08 PROCEDURE — 99284 EMERGENCY DEPT VISIT MOD MDM: CPT

## 2019-03-08 PROCEDURE — 73502 X-RAY EXAM HIP UNI 2-3 VIEWS: CPT

## 2019-03-08 PROCEDURE — 96374 THER/PROPH/DIAG INJ IV PUSH: CPT

## 2019-03-08 RX ORDER — OXYCODONE AND ACETAMINOPHEN 2.5; 325 MG/1; MG/1
1 TABLET ORAL EVERY 8 HOURS PRN
Qty: 6 TABLET | Refills: 0 | Status: SHIPPED | OUTPATIENT
Start: 2019-03-08 | End: 2019-03-14

## 2019-03-08 RX ORDER — FENTANYL CITRATE 50 UG/ML
75 INJECTION, SOLUTION INTRAMUSCULAR; INTRAVENOUS ONCE
Status: COMPLETED | OUTPATIENT
Start: 2019-03-08 | End: 2019-03-08

## 2019-03-08 RX ORDER — ACETAMINOPHEN 325 MG/1
975 TABLET ORAL ONCE
Status: COMPLETED | OUTPATIENT
Start: 2019-03-08 | End: 2019-03-08

## 2019-03-08 RX ADMIN — FENTANYL CITRATE 75 MCG: 50 INJECTION, SOLUTION INTRAMUSCULAR; INTRAVENOUS at 14:38

## 2019-03-08 RX ADMIN — ACETAMINOPHEN 975 MG: 325 TABLET ORAL at 11:12

## 2019-03-08 NOTE — CONSULTS
Orthopedics   Jameson Hastings 66 y o  female MRN: [id number]  Unit/Bed#: Cat Scan      Chief Complaint:   left anterior pelvis pain    HPI:   66 y  o female status post fall complaining of left groin pain  Pt slipped on ice last night  After the fall, she had pain in the L groin, but was able to bear weight  Pain was well localized to the L groin, worse with palpation or movement  Alleviated with rest  She denies any weakness, numbness, or paresthesias in the LLE  Review Of Systems:   · Skin: Normal  · Neuro: See HPI  · Musculoskeletal: See HPI  · 14 point review of systems negative except as stated above     Past Medical History:   Past Medical History:   Diagnosis Date    Anxiety     Arthritis     Chronic pain disorder     right hip    Hard of hearing     History of transfusion 05/01/2016    Hypertension     Iron deficiency     Osteoporosis     Wears dentures        Past Surgical History:   Past Surgical History:   Procedure Laterality Date    CARPAL TUNNEL RELEASE Right     FRACTURE SURGERY      Right hip ORIF    HIP SURGERY      NEUROPLASTY      Decompression Median Nerve At Carpal Tunnel    MN FUSION BIG TOE,MT-P JT Left 9/14/2018    Procedure: 1ST MTPJ FUSION;  Surgeon: Lily Hill DPM;  [facility];  Service: Podiatry    MN OPEN FIX INTER/SUBTROCH FX,IMPLNT Right 4/29/2016    Procedure: Removal of deep implant; IM inpending right femur fracture;  Surgeon: Keren Hogan MD;  [facility];  Service: Orthopedics    TONSILLECTOMY      TUBAL LIGATION         Family History:  Family history reviewed and non-contributory  Family History   Problem Relation Age of Onset    Hypertension Mother     Thyroid disease Mother     Osteoporosis Mother     Hypertension Father     Aneurysm Father     Arthritis Family     Hyperthyroidism Family     Osteoporosis Family        Social History:  Social History     Socioeconomic History    Marital status:       Spouse name: None    Number of children: None    Years of education: None    Highest education level: None   Occupational History    None   Social Needs    Financial resource strain: None    Food insecurity:     Worry: None     Inability: None    Transportation needs:     Medical: None     Non-medical: None   Tobacco Use    Smoking status: Current Some Day Smoker     Packs/day: 0 25     Years: 47 00     Pack years: 11 75     Types: Cigarettes     Last attempt to quit: 10/3/2018     Years since quittin 4    Smokeless tobacco: Never Used    Tobacco comment: has occasional cravings  pt refusing further     Substance and Sexual Activity    Alcohol use: No    Drug use: No    Sexual activity: Never     Comment:    Lifestyle    Physical activity:     Days per week: None     Minutes per session: None    Stress: None   Relationships    Social connections:     Talks on phone: None     Gets together: None     Attends Faith service: None     Active member of club or organization: None     Attends meetings of clubs or organizations: None     Relationship status: None    Intimate partner violence:     Fear of current or ex partner: None     Emotionally abused: None     Physically abused: None     Forced sexual activity: None   Other Topics Concern    None   Social History Narrative    Current Diet High in Sugar Includes high sugar beverages    Daily Coffee Consumption(1 Cups/Day)       Allergies:    Allergies   Allergen Reactions    Lisinopril      Cough    Naproxen GI Intolerance           Labs:  0   Lab Value Date/Time    HCT 33 6 (L) 2018 0541    HCT 34 5 (L) 2018 0643    HCT 30 8 (L) 2018 1340    HCT 41 8 10/23/2015 1309    HGB 10 9 (L) 2018 0541    HGB 10 9 (L) 2018 0643    HGB 9 7 (L) 2018 1340    HGB 13 5 10/23/2015 1309    INR 1 02 2018 1255    WBC 10 34 (H) 2018 0541    WBC 12 23 (H) 2018 0643    WBC 11 77 (H) 2018 1340    WBC 4 81 10/23/2015 1309    ESR 5 10/23/2015 1309       Meds:  No current facility-administered medications for this encounter  Current Outpatient Medications:     acetaminophen (TYLENOL) 500 mg tablet, Take 2 tablets (1,000 mg total) by mouth 2 (two) times a day (Patient not taking: Reported on 10/23/2018 ), Disp: 90 tablet, Rfl: 1    albuterol (PROVENTIL HFA,VENTOLIN HFA) 90 mcg/act inhaler, Inhale 2 puffs every 4 (four) hours as needed for wheezing, Disp: 1 Inhaler, Rfl: 5    ascorbic acid (VITAMIN C) 250 MG tablet, Take 1 tablet (250 mg total) by mouth 2 (two) times a day, Disp: , Rfl: 0    celecoxib (CeleBREX) 100 mg capsule, Take 100 mg by mouth 2 (two) times a day, Disp: , Rfl:     nicotine (NICODERM CQ) 21 mg/24 hr TD 24 hr patch, Place 1 patch on the skin daily, Disp: 28 patch, Rfl: 0    varenicline (CHANTIX) 0 5 mg tablet, Take 1 tablet (0 5 mg total) by mouth 2 (two) times a day Start one pill daily  After 3 days 2 pills daily  After 7 days take 4 pills daily  , Disp: 150 tablet, Rfl: 0    Blood Culture:   Lab Results   Component Value Date    BLOODCX No Growth After 5 Days  09/24/2018    BLOODCX No Growth After 5 Days  09/24/2018       Wound Culture:   No results found for: WOUNDCULT    Ins and Outs:  No intake/output data recorded  Physical Exam:   /65 (BP Location: Right arm)   Pulse 99   Temp 98 6 °F (37 °C) (Tympanic)   Resp 18   Ht 5' 4" (1 626 m)   SpO2 97%   BMI 23 86 kg/m²   Gen: Alert and oriented to person, place, time  HEENT: EOMI, eyes clear, moist mucus membranes, hearing intact  Respiratory: Bilateral chest rise  No audible wheezing found  Cardiovascular: Regular Rate and Rhythm  Musculoskeletal: left lower extremity  · Skin intact  · Tender to palpation over pubic rami  · Leg lengths equal  · Painful hip motion  · Sensation intact L1-S1  · Positive knee flexion/extension ankle dorsi/plantar flexion, EHL/FHL   Pt guarding due to pain  · 2+P pulse  · Able to perform straight leg raise    Radiology:   I personally reviewed the films  X-rays pelvis shows left superior and inferior pubic rami fracture    _*_*_*_*_*_*_*_*_*_*_*_*_*_*_*_*_*_*_*_*_*_*_*_*_*_*_*_*_*_*_*_*_*_*_*_*_*_*_*_*_*    Assessment:  66 y  o female S/P fall with left superior and inferior pubic rami fracture  No operative intervention indicated     Plan:   · Weight bearing as tolerated  · Analgesics for pain  · DVT ppx  · PT/OT  · Dispo: Dawit Guerra for discharge from ortho perspective    Cathleen Saunders MD

## 2019-03-08 NOTE — ED PROVIDER NOTES
History  Chief Complaint   Patient presents with    Fall     pt states she fell yesterday on the ice and landed on her hip  c/o left hip pain  denies hitting head  32-year-old with a history of prior right-sided hip fracture Comes in 1 day 24 hours after a fall onto her left hip that she slipped on ice  Said she was able to ambulate into the house has been able to ambulate minimally sense but came in by wheelchair with significant left hip pain  She has not tried to take anything for it  She reports her last hip fracture which was a couple years ago she had a negative x-ray went home walked on it came back at a positive scan  She is concerned that this might be the same thing  She still able to move her limb she has good sensation in the affected limb her pain is primarily at the inguinal crease does not radiate she did not strike her head on falling she is not taking any thinners no pain elsewhere no bruising no swelling          Prior to Admission Medications   Prescriptions Last Dose Informant Patient Reported? Taking?   acetaminophen (TYLENOL) 500 mg tablet  Self No No   Sig: Take 2 tablets (1,000 mg total) by mouth 2 (two) times a day   Patient not taking: Reported on 10/23/2018    albuterol (PROVENTIL HFA,VENTOLIN HFA) 90 mcg/act inhaler   No No   Sig: Inhale 2 puffs every 4 (four) hours as needed for wheezing   ascorbic acid (VITAMIN C) 250 MG tablet  Self No No   Sig: Take 1 tablet (250 mg total) by mouth 2 (two) times a day   celecoxib (CeleBREX) 100 mg capsule   Yes No   Sig: Take 100 mg by mouth 2 (two) times a day   nicotine (NICODERM CQ) 21 mg/24 hr TD 24 hr patch  Self No No   Sig: Place 1 patch on the skin daily   varenicline (CHANTIX) 0 5 mg tablet   No No   Sig: Take 1 tablet (0 5 mg total) by mouth 2 (two) times a day Start one pill daily  After 3 days 2 pills daily  After 7 days take 4 pills daily        Facility-Administered Medications: None       Past Medical History:   Diagnosis Date    Anxiety     Arthritis     Chronic pain disorder     right hip    Hard of hearing     History of transfusion 2016    Hypertension     Iron deficiency     Osteoporosis     Wears dentures        Past Surgical History:   Procedure Laterality Date    CARPAL TUNNEL RELEASE Right     FRACTURE SURGERY      Right hip ORIF    HIP SURGERY      NEUROPLASTY      Decompression Median Nerve At Carpal Tunnel    IN FUSION BIG TOE,MT-P JT Left 2018    Procedure: 1ST MTPJ FUSION;  Surgeon: Mic Fletcher DPM;  [facility];  Service: Podiatry    IN OPEN FIX INTER/SUBTROCH FX,IMPLNT Right 2016    Procedure: Removal of deep implant; IM inpending right femur fracture;  Surgeon: Anna Mas MD;  [facility];  Service: Orthopedics    TONSILLECTOMY      TUBAL LIGATION         Family History   Problem Relation Age of Onset    Hypertension Mother     Thyroid disease Mother     Osteoporosis Mother     Hypertension Father     Aneurysm Father     Arthritis Family     Hyperthyroidism Family     Osteoporosis Family      I have reviewed and agree with the history as documented  Social History     Tobacco Use    Smoking status: Current Some Day Smoker     Packs/day: 0 25     Years: 47 00     Pack years: 11 75     Types: Cigarettes     Last attempt to quit: 10/3/2018     Years since quittin 4    Smokeless tobacco: Never Used    Tobacco comment: has occasional cravings  pt refusing further     Substance Use Topics    Alcohol use: No    Drug use: No        Review of Systems   Constitutional: Negative for activity change, appetite change, chills, diaphoresis, fatigue and fever  HENT: Negative for congestion, rhinorrhea, sinus pressure, sinus pain, sneezing, sore throat, trouble swallowing and voice change  Eyes: Negative for visual disturbance  Respiratory: Negative for choking, chest tightness, shortness of breath, wheezing and stridor  Cardiovascular: Negative for chest pain, palpitations and leg swelling  Gastrointestinal: Negative for abdominal distention, abdominal pain, blood in stool, nausea and vomiting  Endocrine: Negative for polyuria  Genitourinary: Negative for difficulty urinating, dyspareunia, dysuria, enuresis, flank pain, frequency, hematuria and vaginal bleeding  Musculoskeletal: Positive for arthralgias and gait problem  Negative for neck pain and neck stiffness  Skin: Negative for color change and rash  Allergic/Immunologic: Negative for food allergies  Neurological: Negative for dizziness, tremors, speech difficulty, weakness, light-headedness and headaches  Psychiatric/Behavioral: Negative for confusion  The patient is not nervous/anxious  Physical Exam  ED Triage Vitals [03/08/19 0944]   Temperature Pulse Respirations Blood Pressure SpO2   98 6 °F (37 °C) (!) 107 18 141/83 99 %      Temp Source Heart Rate Source Patient Position - Orthostatic VS BP Location FiO2 (%)   Tympanic Monitor Sitting Left arm --      Pain Score       Worst Possible Pain           Orthostatic Vital Signs  Vitals:    03/08/19 1108 03/08/19 1238 03/08/19 1430 03/08/19 1513   BP: 137/71 127/65 117/66 126/64   Pulse: 101 99 96 96   Patient Position - Orthostatic VS: Lying Lying Lying Sitting       Physical Exam   Constitutional: She is oriented to person, place, and time  She appears well-developed and well-nourished  No distress  HENT:   Head: Normocephalic and atraumatic  Right Ear: External ear normal    Left Ear: External ear normal    Nose: Nose normal    Eyes: Pupils are equal, round, and reactive to light  Conjunctivae and EOM are normal  No scleral icterus  Neck: Normal range of motion  Neck supple  Cardiovascular: Normal rate, regular rhythm, normal heart sounds and intact distal pulses  No murmur heard  Pulmonary/Chest: Effort normal and breath sounds normal  No stridor  No respiratory distress   She has no wheezes  She has no rales  Abdominal: Soft  Bowel sounds are normal  She exhibits no distension and no mass  There is no tenderness  There is no rebound and no guarding  Musculoskeletal: Normal range of motion  She exhibits no edema, tenderness or deformity  Lymphadenopathy:     She has no cervical adenopathy  Neurological: She is alert and oriented to person, place, and time  No cranial nerve deficit or sensory deficit  Skin: Skin is warm and dry  Capillary refill takes less than 2 seconds  No rash noted  She is not diaphoretic  Psychiatric: She has a normal mood and affect  Her behavior is normal  Judgment and thought content normal    Nursing note and vitals reviewed  ED Medications  Medications   acetaminophen (TYLENOL) tablet 975 mg (975 mg Oral Given 3/8/19 1112)   fentanyl citrate (PF) 100 MCG/2ML 75 mcg (75 mcg Intravenous Given 3/8/19 1438)       Diagnostic Studies  Results Reviewed     None                 CT hip left without contrast   Final Result by Xochilt Schreiber DO (03/08 1222)      Nondisplaced fractures left superior and inferior pubic rami  Lower lumbar degenerative changes as above  Workstation performed: [id number]         XR hip/pelv 2-3 vws left   ED Interpretation by Elvia Cook MD (03/08 1142)   No fracture      Final Result by Xochilt Schreiber DO (03/08 1215)      Nondisplaced fractures left superior and inferior pubic rami              Workstation performed: [id number]               Procedures  Procedures      Phone Consults  ED Phone Contact    ED Course  ED Course as of Mar 13 0913   Fri Mar 08, 2019   1318 Fracture superior inferior rami  Ortho consult                                  MDM  Number of Diagnoses or Management Options  Closed fracture of left superior pubic ramus, initial encounter [facility]):   Fracture of inferior pubic ramus [facility]):   Diagnosis management comments: X-ray without any evidence of fracture on ED read sent for CT scan comes back with left inferior and superior rami fractures which were later appreciated by the radiologist on x-ray ortho was consulted and felt patient was appropriate for clinic follow-up was discharged able to ambulate out of the emergency department      Disposition  Final diagnoses:   Closed fracture of left superior pubic ramus, initial encounter ([facility] [address] )   Fracture of inferior pubic ramus ([facility] [address] )     Time reflects when diagnosis was documented in both MDM as applicable and the Disposition within this note     Time User Action Codes Description Comment    3/8/2019  1:19 PM Feliz Beckwith Add [S32 512A] Closed fracture of left superior pubic ramus, initial encounter ([facility] [address] )     3/8/2019  1:19 PM Feliz Beckwith Add [S32 599A] Fracture of inferior pubic ramus [facility])       ED Disposition     ED Disposition Condition Date/Time Comment    Discharge Stable Fri Mar 8, 2019  3:00 PM Pauleen Agent discharge to home/self care              Follow-up Information    None         Discharge Medication List as of 3/8/2019  3:03 PM      START taking these medications    Details   oxyCODONE-acetaminophen (PERCOCET) 2 5-325 MG per tablet Take 1 tablet by mouth every 8 (eight) hours as needed for moderate pain for up to 10 daysMax Daily Amount: 3 tablets, Starting Fri 3/8/2019, Until Mon 3/18/2019, Print         CONTINUE these medications which have NOT CHANGED    Details   acetaminophen (TYLENOL) 500 mg tablet Take 2 tablets (1,000 mg total) by mouth 2 (two) times a day, Starting Fri 10/12/2018, Normal      albuterol (PROVENTIL HFA,VENTOLIN HFA) 90 mcg/act inhaler Inhale 2 puffs every 4 (four) hours as needed for wheezing, Starting Wed 10/17/2018, Normal      ascorbic acid (VITAMIN C) 250 MG tablet Take 1 tablet (250 mg total) by mouth 2 (two) times a day, Starting Thu 9/27/2018, No Print      celecoxib (CeleBREX) 100 mg capsule Take 100 mg by mouth 2 (two) times a day, Historical Med      nicotine (NICODERM CQ) 21 mg/24 hr TD 24 hr patch Place 1 patch on the skin daily, Starting Fri 9/28/2018, Print      varenicline (CHANTIX) 0 5 mg tablet Take 1 tablet (0 5 mg total) by mouth 2 (two) times a day Start one pill daily  After 3 days 2 pills daily  After 7 days take 4 pills daily  , Starting Tue 11/13/2018, Normal           No discharge procedures on file  ED Provider  Attending physically available and evaluated Darinel Pineda I managed the patient along with the ED Attending      Electronically Signed by         Latisha Alejandre MD  03/13/19 4159

## 2019-03-10 NOTE — ED ATTENDING ATTESTATION
Layla Gallardo MD, saw and evaluated the patient  I have discussed the patient with the resident/non-physician practitioner and agree with the resident's/non-physician practitioner's findings, Plan of Care, and MDM as documented in the resident's/non-physician practitioner's note, except where noted  All available labs and Radiology studies were reviewed  I was present for key portions of any procedure(s) performed by the resident/non-physician practitioner and I was immediately available to provide assistance  At this point I agree with the current assessment done in the Emergency Department  I have conducted an independent evaluation of this patient a history and physical is as follows:    67 y/o F with slip and fall on ice yesterday landing on left hip  Has significant pain but able to ambulate  No weakness, numbness, tingling  Denies head injury or head pain  Denies neck pain  No chest, back, abd pain  Awake and alert, NAD  Head AT/NC  No c-spine tenderness  Heart RRR, no M/R/G  Lungs CTA/B  Abd soft, NT, ND  No back tenderness  There is medial L hip tenderness  Neurovascular intact  Imaging shows pubic rami fractures  Discussed with ortho and appropriate for o/p f/u  Patient able to ambulate  Will discharge with f/u instructions          Critical Care Time  Procedures

## 2019-03-13 ENCOUNTER — HOSPITAL ENCOUNTER (EMERGENCY)
Facility: HOSPITAL | Age: 64
Discharge: HOME | End: 2019-03-13
Attending: EMERGENCY MEDICINE | Admitting: HOSPITALIST
Payer: COMMERCIAL

## 2019-03-13 ENCOUNTER — APPOINTMENT (EMERGENCY)
Dept: RADIOLOGY | Facility: HOSPITAL | Age: 64
End: 2019-03-13
Payer: COMMERCIAL

## 2019-03-13 VITALS
SYSTOLIC BLOOD PRESSURE: 132 MMHG | RESPIRATION RATE: 18 BRPM | DIASTOLIC BLOOD PRESSURE: 81 MMHG | WEIGHT: 136 LBS | BODY MASS INDEX: 23.22 KG/M2 | TEMPERATURE: 97.5 F | OXYGEN SATURATION: 98 % | HEIGHT: 64 IN | HEART RATE: 104 BPM

## 2019-03-13 DIAGNOSIS — S32.599A FRACTURE OF MULTIPLE PUBIC RAMI (HCC): Primary | ICD-10-CM

## 2019-03-13 PROCEDURE — 99284 EMERGENCY DEPT VISIT MOD MDM: CPT

## 2019-03-13 PROCEDURE — 73700 CT LOWER EXTREMITY W/O DYE: CPT

## 2019-03-13 RX ORDER — OXYCODONE HYDROCHLORIDE 5 MG/1
5 TABLET ORAL ONCE
Status: COMPLETED | OUTPATIENT
Start: 2019-03-13 | End: 2019-03-13

## 2019-03-13 RX ADMIN — OXYCODONE HYDROCHLORIDE 5 MG: 5 TABLET ORAL at 14:21

## 2019-03-13 NOTE — ED ATTENDING ATTESTATION
Amado Zamudio MD, saw and evaluated the patient  I have discussed the patient with the resident/non-physician practitioner and agree with the resident's/non-physician practitioner's findings, Plan of Care, and MDM as documented in the resident's/non-physician practitioner's note, except where noted  All available labs and Radiology studies were reviewed  I was present for key portions of any procedure(s) performed by the resident/non-physician practitioner and I was immediately available to provide assistance  At this point I agree with the current assessment done in the Emergency Department  I have conducted an independent evaluation of this patient a history and physical is as follows:      Critical Care Time  Procedures     67 yo female with fall one week ago, known pubi rami fx and now having increased pain and having trouble walking  Pt unable to see ortho as outpatient  No numbness, tingling, weakness  No abdominal pain  Vss, afebrile, lungs cta, rrr, abdomen soft nontender, left pelvic tenderness, full rom of hip  Ct pelvis, pain meds  Discuss with ortho

## 2019-03-13 NOTE — ED NOTES
Pt ambulated independently with walker approximately 100 ft  Pt tolerated walk well and states her pain has improved       Cedric Sutton RN  03/13/19 7860

## 2019-03-13 NOTE — ED PROVIDER NOTES
History  Chief Complaint   Patient presents with    Hip Pain     Pt states was seen her on the 8th after a fall on ice, diagnosed with pubic rami fracture to left  Pt states unable to walk, having increased pain  Pt unable to get an appt with ortho     65 yo F presents to ED for worsening L hip pain  Pt says she sllipped/fell on ice on 3/8 and was evaluated and found to have L superior and inferior pubic rami fractures  She was discharged home with percocet  Pt says for the last two days, her L hip pain has been gradually worsening  She denies any new injuries or fall  She has been walking with a walker at home but is now too painful to do so  No numbness/tingling or weakness  She tried to call orthopedics clinic today but was told her orthopedic doctor was working on a different campus today and she was unable to get an appointment with anyone else today, so she came to the ED for evaluation             None       Past Medical History:   Diagnosis Date    Anxiety     Arthritis     Chronic pain disorder     right hip    Hard of hearing     History of transfusion 05/01/2016    Hypertension     Iron deficiency     Osteoporosis     Wears dentures        Past Surgical History:   Procedure Laterality Date    CARPAL TUNNEL RELEASE Right     FRACTURE SURGERY      Right hip ORIF    HIP SURGERY      NEUROPLASTY      Decompression Median Nerve At Carpal Tunnel    [place] FUSION BIG TOE,MT-P JT Left 9/14/2018    Procedure: 1ST MTPJ FUSION;  Surgeon: Florecita Mabry DPM;  Location: [facility];  Service: Podiatry    SD OPEN FIX INTER/SUBTROCH FX,IMPLNT Right 4/29/2016    Procedure: Removal of deep implant; IM inpending right femur fracture;  Surgeon: Julio Francisco MD;  Location:  MAIN OR;  Service: Orthopedics    TONSILLECTOMY      TUBAL LIGATION         Family History   Problem Relation Age of Onset    Hypertension Mother     Thyroid disease Mother     Osteoporosis Mother     Hypertension Father  Aneurysm Father     Arthritis Family     Hyperthyroidism Family     Osteoporosis Family      I have reviewed and agree with the history as documented  Social History     Tobacco Use    Smoking status: Current Some Day Smoker     Packs/day: 0 25     Years: 47 00     Pack years: 11 75     Types: Cigarettes     Last attempt to quit: 10/3/2018     Years since quittin 4    Smokeless tobacco: Never Used    Tobacco comment: has occasional cravings  pt refusing further     Substance Use Topics    Alcohol use: No    Drug use: No        Review of Systems   Constitutional: Negative for activity change, chills and fever  HENT: Negative for congestion, rhinorrhea, sore throat and trouble swallowing  Eyes: Negative for pain, discharge and visual disturbance  Respiratory: Negative for cough, chest tightness and shortness of breath  Cardiovascular: Negative for chest pain, palpitations and leg swelling  Gastrointestinal: Negative for abdominal pain, nausea and vomiting  Genitourinary: Negative for dysuria, frequency and urgency  Musculoskeletal: Positive for gait problem  Negative for neck pain and neck stiffness  Skin: Negative for color change, rash and wound  Neurological: Negative for dizziness, syncope, light-headedness and headaches         Physical Exam  ED Triage Vitals [19 1348]   Temperature Pulse Respirations Blood Pressure SpO2   97 5 °F (36 4 °C) 105 18 145/99 100 %      Temp Source Heart Rate Source Patient Position - Orthostatic VS BP Location FiO2 (%)   Tympanic Monitor Sitting Left arm --      Pain Score       Worst Possible Pain           qSOFA     Row Name 19 1743 19 1552 19 1359 19 1348       Altered mental status GCS < 15  --  --  0  --     Respiratory Rate > / =22  0  0  --  0     Systolic BP < / =609  0  0  --  0     Q Sofa Score  0  0  0  0           Orthostatic Vital Signs  Vitals:    19 1348 19 1545 19 1552 03/13/19 1743   BP: 145/99  142/79 132/81   Pulse: 105 98 98 104   Patient Position - Orthostatic VS: Sitting  Sitting Lying       Physical Exam   Constitutional: She is oriented to person, place, and time  She appears well-developed and well-nourished  Appears uncomfortable   HENT:   Head: Normocephalic and atraumatic  Mouth/Throat: Oropharynx is clear and moist    Eyes: Conjunctivae and EOM are normal  Right eye exhibits no discharge  Left eye exhibits no discharge  Neck: Normal range of motion  Neck supple  nontender   Cardiovascular: Regular rhythm  No murmur heard  Mild tachycardia   Pulmonary/Chest: Effort normal  No respiratory distress  She exhibits no tenderness  Abdominal: Soft  There is no tenderness  There is no rebound and no guarding  Musculoskeletal: She exhibits tenderness (L hip)  She exhibits no edema  Decreased ROM L hip due to pain   Neurological: She is alert and oriented to person, place, and time  She exhibits normal muscle tone  Skin: Skin is warm and dry  Capillary refill takes less than 2 seconds  She is not diaphoretic  Nursing note and vitals reviewed  ED Medications  Medications   oxyCODONE (ROXICODONE) IR tablet 5 mg (5 mg Oral Given 3/13/19 1421)       Diagnostic Studies  Results Reviewed     None                 CT hip left without contrast   Final Result by Madisyn Mcrae MD (03/13 1519)   1  Redemonstration of the left superior and inferior pubic rami fractures  Slight interval widening and minimal displacement of the superior pubic ramus fracture relative to prior examination  2   No new fracture or dislocation  Workstation performed: [id number]               Procedures  Procedures      Phone Consults  ED Phone Contact    ED Course                               MDM  Number of Diagnoses or Management Options  Fracture of multiple pubic rami [facility]):   Diagnosis management comments: Repeat CT done shows again pubic rami fractures   Discussed with ortho who states non-op  Discussed with pt, who states she feels much better now after oxycodone and wants to go home  Pt ambulatory in ED  Return precautions discussed  Follow up outpatient  Encouraged pt consider PT  Disposition  Final diagnoses:   Fracture of multiple pubic rami ([address] )     Time reflects when diagnosis was documented in both MDM as applicable and the Disposition within this note     Time User Action Codes Description Comment    3/13/2019  5:43 PM Oletha Francisco Javier Add [R26 2] Ambulatory dysfunction     3/13/2019  5:44 PM Oletha Francisco Javier Add [S32 599A] Fracture of multiple pubic rami ([address] )     3/13/2019  6:00 PM Oletha Francisco Javier Modify [S32 599A] Fracture of multiple pubic rami ([address] )     3/13/2019  6:00 PM Jack Wilson [R26 2] Ambulatory dysfunction       ED Disposition     ED Disposition Condition Date/Time Comment    Discharge Stable Wed Mar 13, 2019  6:00 PM Jose A Ennis discharge to home/self care              Follow-up Information     Follow up With Specialties Details Why [address] [facility] Emergency Department Emergency Medicine  As needed, If symptoms worsen [address]  [phone]  ED, [address] [facility] [address], [place], [place], [address]    Physical Therapy at [facility] Schedule an appointment as soon as possible for a visit   [place] [address] [address]n  [address] [facility], [address] [facility] [address], [place], [place], [address]          Discharge Medication List as of 3/13/2019  6:02 PM      CONTINUE these medications which have NOT CHANGED    Details   albuterol (PROVENTIL HFA,VENTOLIN HFA) 90 mcg/act inhaler Inhale 2 puffs every 4 (four) hours as needed for wheezing, Starting Wed 10/17/2018, Normal      oxyCODONE-acetaminophen (PERCOCET) 2 5-325 MG per tablet Take 1 tablet by mouth every 8 (eight) hours as needed for moderate pain for up to 10 daysMax Daily Amount: 3 tablets, Starting Fri 3/8/2019, Until Mon 3/18/2019, Print           No discharge procedures on file  ED Provider  Attending physically available and evaluated Kentrellalli Durandagmar LOCK managed the patient along with the ED Attending      Electronically Signed by         Jolie Lombardi MD  03/20/19 5874

## 2019-03-14 ENCOUNTER — TELEPHONE (OUTPATIENT)
Dept: PULMONOLOGY | Facility: CLINIC | Age: 64
End: 2019-03-14

## 2019-03-14 ENCOUNTER — OFFICE VISIT (OUTPATIENT)
Dept: OBGYN CLINIC | Facility: HOSPITAL | Age: 64
End: 2019-03-14
Payer: COMMERCIAL

## 2019-03-14 VITALS — BODY MASS INDEX: 22.28 KG/M2 | WEIGHT: 129.8 LBS

## 2019-03-14 DIAGNOSIS — S32.592A PUBIC RAMUS FRACTURE, LEFT, CLOSED, INITIAL ENCOUNTER (HCC): Primary | ICD-10-CM

## 2019-03-14 PROCEDURE — 99213 OFFICE O/P EST LOW 20 MIN: CPT | Performed by: ORTHOPAEDIC SURGERY

## 2019-03-14 RX ORDER — NAPROXEN 500 MG/1
500 TABLET ORAL 2 TIMES DAILY WITH MEALS
Qty: 60 TABLET | Refills: 1 | Status: SHIPPED | OUTPATIENT
Start: 2019-03-14 | End: 2019-03-14 | Stop reason: SDUPTHER

## 2019-03-14 RX ORDER — ACETAMINOPHEN AND CODEINE PHOSPHATE 300; 30 MG/1; MG/1
1 TABLET ORAL EVERY 4 HOURS PRN
Qty: 30 TABLET | Refills: 0 | Status: SHIPPED | OUTPATIENT
Start: 2019-03-14 | End: 2019-04-05

## 2019-03-14 RX ORDER — ACETAMINOPHEN AND CODEINE PHOSPHATE 300; 30 MG/1; MG/1
1 TABLET ORAL EVERY 4 HOURS PRN
Qty: 30 TABLET | Refills: 0 | Status: SHIPPED | OUTPATIENT
Start: 2019-03-14 | End: 2019-03-14 | Stop reason: SDUPTHER

## 2019-03-14 RX ORDER — NAPROXEN 500 MG/1
500 TABLET ORAL 2 TIMES DAILY WITH MEALS
Qty: 60 TABLET | Refills: 1 | Status: SHIPPED | OUTPATIENT
Start: 2019-03-14 | End: 2019-04-05

## 2019-03-14 RX ORDER — TIZANIDINE HYDROCHLORIDE 2 MG/1
2 CAPSULE, GELATIN COATED ORAL 3 TIMES DAILY
Qty: 90 CAPSULE | Refills: 0 | Status: SHIPPED | OUTPATIENT
Start: 2019-03-14 | End: 2019-04-05

## 2019-03-14 NOTE — LETTER
March 14, 2019     Patient: Lubna Lazo   YOB: 1955   Date of Visit: 3/14/2019       To Whom it May Concern:    Sanjay Damian is under my professional care  She was seen in my office on 3/14/2019  She is unable to return back to work at this time until she is cleared by my office  Will re evaluate patient in 4 weeks     If you have any questions or concerns, please don't hesitate to call           Sincerely,          Lambert Hinton MD        CC: No Recipients

## 2019-03-14 NOTE — PROGRESS NOTES
Assessment:  1  Pubic ramus fracture, left, closed, initial encounter ([facility] [address] )         Plan:     Weight Bearing  as Tolerated   Prescribed patient T#3 , Naprosyn and Tizanidine for pain    Continue icing    Follow up in 4 weeks    No work until cleared by my office   Discussed treatment plan with patient and she is in agreement treatment plan  Thank you          The above stated was discussed in layman's terms and the patient expressed understanding  All questions were answered to the patient's satisfaction  Subjective:   Liat Saucedo is a 66 y o  female who presents for a left pubic ramus fracture from a slip and fall on ice on 3/14/19  Patient went to the ER and notes she had CT scan, x-rays and was give Percocet while in the hospital  Patient return to the hospital 5 days later for increase pain  Pain notes she having constant pain  In the right left groin area  Pain is worse with ambulating and sitting for along peroid of time  She is ambulating with a walker since the fall  She is currently note taking any pain medications  She denies any numbness or tingling         Review of systems negative unless otherwise specified in HPI    Past Medical History:   Diagnosis Date    Anxiety     Arthritis     Chronic pain disorder     right hip    Hard of hearing     History of transfusion 05/01/2016    Hypertension     Iron deficiency     Osteoporosis     Wears dentures        Past Surgical History:   Procedure Laterality Date    CARPAL TUNNEL RELEASE Right     FRACTURE SURGERY      Right hip ORIF    HIP SURGERY      NEUROPLASTY      Decompression Median Nerve At Carpal Tunnel    [place] FUSION BIG TOE,MT-P JT Left 9/14/2018    Procedure: 1ST MTPJ FUSION;  Surgeon: Nia Schuster DPM;  Location: [facility];  Service: Podiatry    [place] OPEN FIX INTER/SUBTROCH FX,IMPLNT Right 4/29/2016    Procedure: Removal of deep implant; IM inpending right femur fracture;  Surgeon: Altagracia Bailey MD; [facility];  Service: Orthopedics    TONSILLECTOMY      TUBAL LIGATION         Family History   Problem Relation Age of Onset    Hypertension Mother     Thyroid disease Mother     Osteoporosis Mother     Hypertension Father     Aneurysm Father     Arthritis Family     Hyperthyroidism Family     Osteoporosis Family        Social History     Occupational History    Not on file   Tobacco Use    Smoking status: Current Some Day Smoker     Packs/day: 0 25     Years: 47 00     Pack years: 11 75     Types: Cigarettes     Last attempt to quit: 10/3/2018     Years since quittin 4    Smokeless tobacco: Never Used    Tobacco comment: has occasional cravings  pt refusing further     Substance and Sexual Activity    Alcohol use: No    Drug use: No    Sexual activity: Never     Comment:        No current outpatient medications on file  Allergies   Allergen Reactions    Lisinopril      Cough    Naproxen GI Intolerance            Vitals:       Objective:            Physical Exam  · General: Awake, Alert, Oriented  · Eyes: Pupils equal, round and reactive to light  · Heart: regular rate and rhythm  · Lungs: No audible wheezing  · Abdomen: soft                    Ortho Exam  Left hip  No laceration, no abrasion, no open wounds  Minimal pain with AP lateral compression  Pain with flexion of hip against resistant  Neurovascularly Intact Distally     Diagnostics, reviewed and taken today if performed as documented:     The attending physician has personally reviewed the pertinent films in PACS and interpretation is as follows:  XR Left hip: non-displaced inferior Pubic ramus fracture     Procedures, if performed today:    Procedures    None performed      Scribe Attestation    I,:   Rosana Knox am acting as a scribe while in the presence of the attending physician :        I,:   Lyssa Cannon MD personally performed the services described in this documentation    as scribed in my presence :              Portions of the record may have been created with voice recognition software  Occasional wrong word or "sound a like" substitutions may have occurred due to the inherent limitations of voice recognition software  Read the chart carefully and recognize, using context, where substitutions have occurred

## 2019-03-14 NOTE — TELEPHONE ENCOUNTER
vianeym for pt to cb to discuss getting second chest x-ray done and setting up follow up appointment after

## 2019-03-15 ENCOUNTER — TRANSITIONAL CARE MANAGEMENT (OUTPATIENT)
Dept: INTERNAL MEDICINE CLINIC | Facility: CLINIC | Age: 64
End: 2019-03-15

## 2019-04-05 ENCOUNTER — HOSPITAL ENCOUNTER (EMERGENCY)
Facility: HOSPITAL | Age: 64
Discharge: HOME | End: 2019-04-05
Attending: EMERGENCY MEDICINE | Admitting: EMERGENCY MEDICINE
Payer: COMMERCIAL

## 2019-04-05 VITALS
RESPIRATION RATE: 19 BRPM | TEMPERATURE: 97.5 F | SYSTOLIC BLOOD PRESSURE: 146 MMHG | DIASTOLIC BLOOD PRESSURE: 87 MMHG | HEART RATE: 100 BPM | BODY MASS INDEX: 21.57 KG/M2 | WEIGHT: 125.66 LBS | OXYGEN SATURATION: 98 %

## 2019-04-05 DIAGNOSIS — N39.0 UTI (URINARY TRACT INFECTION): Primary | ICD-10-CM

## 2019-04-05 DIAGNOSIS — R31.9 HEMATURIA: ICD-10-CM

## 2019-04-05 LAB
BACTERIA UR QL AUTO: ABNORMAL /HPF
BILIRUB UR QL STRIP: ABNORMAL
CLARITY UR: ABNORMAL
COLOR UR: ABNORMAL
GLUCOSE UR STRIP-MCNC: ABNORMAL MG/DL
HGB UR QL STRIP.AUTO: ABNORMAL
KETONES UR STRIP-MCNC: ABNORMAL MG/DL
LEUKOCYTE ESTERASE UR QL STRIP: ABNORMAL
NITRITE UR QL STRIP: ABNORMAL
NON-SQ EPI CELLS URNS QL MICRO: ABNORMAL /HPF
PROT UR STRIP-MCNC: ABNORMAL MG/DL
RBC #/AREA URNS AUTO: ABNORMAL /HPF
SP GR UR STRIP.AUTO: 1.03 (ref 1–1.03)
UROBILINOGEN UR QL STRIP.AUTO: ABNORMAL E.U./DL
WBC #/AREA URNS AUTO: ABNORMAL /HPF

## 2019-04-05 PROCEDURE — 81001 URINALYSIS AUTO W/SCOPE: CPT | Performed by: EMERGENCY MEDICINE

## 2019-04-05 PROCEDURE — 87086 URINE CULTURE/COLONY COUNT: CPT | Performed by: EMERGENCY MEDICINE

## 2019-04-05 PROCEDURE — 99284 EMERGENCY DEPT VISIT MOD MDM: CPT | Performed by: EMERGENCY MEDICINE

## 2019-04-05 PROCEDURE — 99283 EMERGENCY DEPT VISIT LOW MDM: CPT

## 2019-04-05 RX ORDER — ACETAMINOPHEN 325 MG/1
975 TABLET ORAL ONCE
Status: COMPLETED | OUTPATIENT
Start: 2019-04-05 | End: 2019-04-05

## 2019-04-05 RX ORDER — CEPHALEXIN 500 MG/1
500 CAPSULE ORAL EVERY 12 HOURS SCHEDULED
Qty: 20 CAPSULE | Refills: 0 | Status: SHIPPED | OUTPATIENT
Start: 2019-04-05 | End: 2019-04-15

## 2019-04-05 RX ADMIN — ACETAMINOPHEN 975 MG: 325 TABLET ORAL at 07:49

## 2019-04-06 LAB — BACTERIA UR CULT: NORMAL

## 2019-04-11 ENCOUNTER — TELEPHONE (OUTPATIENT)
Dept: OBGYN CLINIC | Facility: HOSPITAL | Age: 64
End: 2019-04-11

## 2019-04-24 ENCOUNTER — TELEPHONE (OUTPATIENT)
Dept: OBGYN CLINIC | Facility: HOSPITAL | Age: 64
End: 2019-04-24

## 2019-04-24 DIAGNOSIS — M25.559 ARTHRALGIA OF HIP, UNSPECIFIED LATERALITY: Primary | ICD-10-CM

## 2019-04-24 RX ORDER — ACETAMINOPHEN AND CODEINE PHOSPHATE 300; 30 MG/1; MG/1
1 TABLET ORAL EVERY 4 HOURS PRN
Qty: 30 TABLET | Refills: 0 | Status: SHIPPED | OUTPATIENT
Start: 2019-04-24 | End: 2019-06-06

## 2019-05-02 ENCOUNTER — HOSPITAL ENCOUNTER (OUTPATIENT)
Dept: RADIOLOGY | Facility: HOSPITAL | Age: 64
Discharge: HOME | End: 2019-05-02
Attending: ORTHOPAEDIC SURGERY
Payer: COMMERCIAL

## 2019-05-02 ENCOUNTER — OFFICE VISIT (OUTPATIENT)
Dept: OBGYN CLINIC | Facility: HOSPITAL | Age: 64
End: 2019-05-02
Payer: COMMERCIAL

## 2019-05-02 VITALS
WEIGHT: 125 LBS | BODY MASS INDEX: 21.34 KG/M2 | HEIGHT: 64 IN | SYSTOLIC BLOOD PRESSURE: 139 MMHG | DIASTOLIC BLOOD PRESSURE: 87 MMHG | HEART RATE: 111 BPM

## 2019-05-02 DIAGNOSIS — M53.3 CHRONIC RIGHT SI JOINT PAIN: ICD-10-CM

## 2019-05-02 DIAGNOSIS — M25.552 PAIN IN LEFT HIP: ICD-10-CM

## 2019-05-02 DIAGNOSIS — M25.552 PAIN OF LEFT HIP JOINT: Primary | ICD-10-CM

## 2019-05-02 DIAGNOSIS — G89.29 CHRONIC RIGHT SI JOINT PAIN: ICD-10-CM

## 2019-05-02 PROCEDURE — 72170 X-RAY EXAM OF PELVIS: CPT

## 2019-05-02 PROCEDURE — 99213 OFFICE O/P EST LOW 20 MIN: CPT | Performed by: ORTHOPAEDIC SURGERY

## 2019-05-02 RX ORDER — TRAMADOL HYDROCHLORIDE 50 MG/1
50 TABLET ORAL EVERY 6 HOURS PRN
Qty: 20 TABLET | Refills: 0 | Status: SHIPPED | OUTPATIENT
Start: 2019-05-02 | End: 2019-05-12

## 2019-05-08 ENCOUNTER — TELEPHONE (OUTPATIENT)
Dept: OBGYN CLINIC | Facility: HOSPITAL | Age: 64
End: 2019-05-08

## 2019-05-24 ENCOUNTER — TELEPHONE (OUTPATIENT)
Dept: OBGYN CLINIC | Facility: HOSPITAL | Age: 64
End: 2019-05-24

## 2019-05-24 DIAGNOSIS — M25.559 ARTHRALGIA OF HIP, UNSPECIFIED LATERALITY: Primary | ICD-10-CM

## 2019-05-24 RX ORDER — TRAMADOL HYDROCHLORIDE 50 MG/1
50 TABLET ORAL EVERY 6 HOURS PRN
Qty: 30 TABLET | Refills: 0 | Status: SHIPPED | OUTPATIENT
Start: 2019-05-24 | End: 2019-05-24 | Stop reason: SDUPTHER

## 2019-05-24 RX ORDER — TRAMADOL HYDROCHLORIDE 50 MG/1
50 TABLET ORAL EVERY 6 HOURS PRN
Qty: 30 TABLET | Refills: 0 | Status: SHIPPED | OUTPATIENT
Start: 2019-05-24 | End: 2019-06-03

## 2019-06-06 ENCOUNTER — OFFICE VISIT (OUTPATIENT)
Dept: OBGYN CLINIC | Facility: HOSPITAL | Age: 64
End: 2019-06-06

## 2019-06-06 ENCOUNTER — HOSPITAL ENCOUNTER (OUTPATIENT)
Dept: RADIOLOGY | Facility: HOSPITAL | Age: 64
Discharge: HOME | End: 2019-06-06
Attending: ORTHOPAEDIC SURGERY
Payer: COMMERCIAL

## 2019-06-06 VITALS
WEIGHT: 117 LBS | BODY MASS INDEX: 20.08 KG/M2 | DIASTOLIC BLOOD PRESSURE: 82 MMHG | HEART RATE: 94 BPM | SYSTOLIC BLOOD PRESSURE: 143 MMHG

## 2019-06-06 DIAGNOSIS — S32.592S PUBIC RAMUS FRACTURE, LEFT, SEQUELA: Primary | ICD-10-CM

## 2019-06-06 DIAGNOSIS — M53.3 CHRONIC RIGHT SI JOINT PAIN: ICD-10-CM

## 2019-06-06 DIAGNOSIS — G89.29 CHRONIC RIGHT SI JOINT PAIN: ICD-10-CM

## 2019-06-06 DIAGNOSIS — M25.552 PAIN OF LEFT HIP JOINT: ICD-10-CM

## 2019-06-06 PROCEDURE — 72170 X-RAY EXAM OF PELVIS: CPT

## 2019-06-06 PROCEDURE — 99024 POSTOP FOLLOW-UP VISIT: CPT | Performed by: ORTHOPAEDIC SURGERY

## 2019-06-06 RX ORDER — TRAMADOL HYDROCHLORIDE 50 MG/1
50 TABLET ORAL EVERY 6 HOURS PRN
Qty: 30 TABLET | Refills: 0 | Status: SHIPPED | OUTPATIENT
Start: 2019-06-06

## 2019-06-06 RX ORDER — NAPROXEN 500 MG/1
500 TABLET ORAL 2 TIMES DAILY WITH MEALS
Qty: 60 TABLET | Refills: 1 | Status: SHIPPED | OUTPATIENT
Start: 2019-06-06

## 2019-06-24 ENCOUNTER — TELEPHONE (OUTPATIENT)
Dept: OBGYN CLINIC | Facility: HOSPITAL | Age: 64
End: 2019-06-24

## 2019-11-06 NOTE — TELEPHONE ENCOUNTER
Called patient to review last chest x-ray with her  Let her know that pneumonia is improving however some residual infiltrate noted  Informed patient that I placed an order for 1 more repeat chest x-ray to make sure that right pneumonia has cleared  Patient will get repeat chest x-ray sometime this week  on unit/1d

## (undated) DEVICE — CURITY STRETCH BANDAGE: Brand: CURITY

## (undated) DEVICE — GLOVE INDICATOR PI UNDERGLOVE SZ 7.5 BLUE

## (undated) DEVICE — WEBRIL 6 IN UNSTERILE

## (undated) DEVICE — THE SIMPULSE SOLO SYSTEM WITH ULTREX RETRACTABLE SPLASH SHIELD TIP: Brand: SIMPULSE SOLO

## (undated) DEVICE — ACE WRAP 4 IN UNSTERILE

## (undated) DEVICE — 10FR FRAZIER SUCTION HANDLE: Brand: CARDINAL HEALTH

## (undated) DEVICE — KERLIX BANDAGE ROLL: Brand: KERLIX

## (undated) DEVICE — 4.0 MM X 2.35 MM X 70.0 MM OVAL CARBIDE BUR, 8 FLUTE

## (undated) DEVICE — BLADE SAGITTAL 25.6 X 9.5MM

## (undated) DEVICE — TUBING SUCTION 5MM X 12 FT

## (undated) DEVICE — GLOVE SRG BIOGEL 7.5

## (undated) DEVICE — PENCIL ELECTROSURG E-Z CLEAN -0035H

## (undated) DEVICE — 2000CC GUARDIAN II: Brand: GUARDIAN

## (undated) DEVICE — DRAPE C-ARM X-RAY

## (undated) DEVICE — INTENDED FOR TISSUE SEPARATION, AND OTHER PROCEDURES THAT REQUIRE A SHARP SURGICAL BLADE TO PUNCTURE OR CUT.: Brand: BARD-PARKER ® CARBON RIB-BACK BLADES

## (undated) DEVICE — 3M™ DURAPORE™ SURGICAL TAPE 1538-3, 3 INCH X 10 YARD (7,5CM X 9,1M), 4 ROLLS/BOX: Brand: 3M™ DURAPORE™

## (undated) DEVICE — SYRINGE 10ML LL

## (undated) DEVICE — BLADE SAGITTAL 63.0 X 19.5MM

## (undated) DEVICE — GLOVE SRG BIOGEL 6.5

## (undated) DEVICE — PREP SURGICAL PURPREP 26ML

## (undated) DEVICE — Device

## (undated) DEVICE — 1.6MM KIRSCHNER WIRE, TROCAR POINTS ON BOTH ENDS 150MM
Type: IMPLANTABLE DEVICE | Site: FOOT | Status: NON-FUNCTIONAL
Removed: 2018-09-14

## (undated) DEVICE — CAST PADDING 4 IN SYNTHETIC NON-STRL

## (undated) DEVICE — CURITY NON-ADHERENT STRIPS: Brand: CURITY

## (undated) DEVICE — PAD CAST 4 IN COTTON NON STERILE

## (undated) DEVICE — PADDING CAST 4 IN  COTTON STRL

## (undated) DEVICE — BETHLEHEM UNIVERSAL  MIONR EXT: Brand: CARDINAL HEALTH

## (undated) DEVICE — ACE WRAP 6 IN UNSTERILE

## (undated) DEVICE — DRAPE C-ARMOUR

## (undated) DEVICE — CHLORAPREP HI-LITE 26ML ORANGE

## (undated) DEVICE — GLOVE INDICATOR PI UNDERGLOVE SZ 6.5 BLUE

## (undated) DEVICE — SCD SEQUENTIAL COMPRESSION COMFORT SLEEVE MEDIUM KNEE LENGTH: Brand: KENDALL SCD

## (undated) DEVICE — SPONGE LAP 18 X 18 IN

## (undated) DEVICE — NEEDLE 18 G X 1 1/2

## (undated) DEVICE — NEEDLE 25G X 1 1/2